# Patient Record
Sex: FEMALE | Race: BLACK OR AFRICAN AMERICAN | Employment: FULL TIME | URBAN - METROPOLITAN AREA
[De-identification: names, ages, dates, MRNs, and addresses within clinical notes are randomized per-mention and may not be internally consistent; named-entity substitution may affect disease eponyms.]

---

## 2018-07-03 ENCOUNTER — HOSPITAL ENCOUNTER (OUTPATIENT)
Dept: PHYSICAL THERAPY | Age: 68
Discharge: HOME | End: 2018-07-03
Payer: COMMERCIAL

## 2018-07-03 PROCEDURE — 97162 PT EVAL MOD COMPLEX 30 MIN: CPT | Performed by: PHYSICAL THERAPIST

## 2018-07-03 PROCEDURE — 97110 THERAPEUTIC EXERCISES: CPT | Performed by: PHYSICAL THERAPIST

## 2018-07-03 NOTE — PROGRESS NOTES
[address] [facility] AT [address], [address] [place] [address] Road  Phone: [phone]   [phone]  PLAN OF CARE / [address] [place] [address] PHYSICAL THERAPY SERVICES  Patient Name: Maryjane Cisneros : 1950   Medical   Diagnosis: Lower back pain [M54.5] Treatment Diagnosis: Lumbar radiculopathy   Onset Date: 2018     Referral Source: Rob Peck MD Start of [facility]): 7/3/2018   Prior Hospitalization: See medical history Provider #: [id number]   Prior Level of Function: Able to ambulate and stand for unlimited time. Comorbidities: HTN, DM, CVA, high BMI, chronic LBP   Medications: Verified on Patient Summary List   The Plan of Care and following information is based on the information from the initial evaluation.   ===========================================================================================  Assessment / key information:  Patient is a 78 y.o. Female that presents today with a provisional classification of posterior lumbar derangement with asymmetric left side radicular symptoms above the knee. Patient responded to repeated lumbar extension with centralization of symptoms. Patient was educated on her diagnosis, prognosis, and POC and instructed to perform repeated lumbar extension in standing or in prone when able hourly to centralize symptoms. Patient educated on spine anatomy, activity modification, and to sit with a lumbar roll to preserve her lumbar lordosis.  Patient verbalized understanding and agrees to this POC.   ===========================================================================================  History HIGH Complexity :3+ comorbidities / personal factors will impact the outcome/ POC ; Examination HIGH Complexity : 4+ Standardized tests and measures addressing body structure, function, activity limitation and / or participation in recreation ; Presentation MEDIUM Complexity : Evolving with changing characteristics ; Decision Making MEDIUM Complexity : FOTO score of 26-74; Complexity MEDIUM  Problem List: pain affecting function, decrease ROM, decrease strength, impaired gait/ balance, decrease ADL/ functional abilitiies, decrease activity tolerance and decrease flexibility/ joint mobility   Treatment Plan may include any combination of the following: Therapeutic exercise, Therapeutic activities, Neuromuscular re-education, Physical agent/modality, Gait/balance training, Manual therapy and Patient education  Patient / Family readiness to learn indicated by: asking questions and trying to perform skills  Persons(s) to be included in education: patient (P)  Barriers to Learning/Limitations: None  Measures taken:    Patient Goal (s): To walk without pain and increase stamina. Patient self reported health status: fair  Rehabilitation Potential: good   Short Term Goals: To be accomplished in  4  weeks:  1. Patient will be independent with HEP to demonstrate active involvement in their recovery. 2. Patient will be able to centralize symptoms independently to demonstrate independence with symptom management.  Long Term Goals: To be accomplished in  12  weeks:  1. Patient will report no radicular symptoms for at least 2 weeks to indicate resolution of symptoms. 2. Increase FOTO score to 51 for improved overall function. 3. Patient will report no onset of radicular symptoms with ambulation up to 45 minutes to improve her ability to ambulate long community distances. Frequency / Duration:   Patient to be seen  2  times per week for 12  weeks: Anticipate a decrease in frequency per week as patient progresses in therapy.    Patient / Caregiver education and instruction: activity modification and exercises  G-Codes (GP): N/A  Therapist Signature: Tish Lomeli DPT Date: 4/1/8010   Certification Period: NA Time: 12:56 PM ===========================================================================================  I certify that the above Physical Therapy Services are being furnished while the patient is under my care. I agree with the treatment plan and certify that this therapy is necessary. Physician Signature:        Date:       Time:     Please sign and return to [facility] or you may fax the signed copy to [phone]. Thank you.

## 2018-07-03 NOTE — PROGRESS NOTES
PT DAILY TREATMENT NOTE     Patient Name: Tamara Buenrostro  Date:7/3/2018  : 1950  [x]  Patient  Verified  Payor: Rosetta Ortiz / Plan: [employer] / Product Type: HMO /    In time:1:40  Out time:2:38  Total Treatment Time (min): 30  Total Timed Codes (min): NA  1:1 Treatment Time (min): NA   Visit #: 1 of 24    Treatment Area: Lower back pain [M54.5]    SUBJECTIVE  Pain Level (0-10 scale): 5/10  Any medication changes, allergies to medications, adverse drug reactions, diagnosis change, or new procedure performed?: [x] No    [] Yes (see summary sheet for update)  Subjective functional status/changes:   [] No changes reported  Patient states having chronic low back pain and similar symptoms in 4445 that were complicated by a CVA at the time; states having left side weakness that improved however continues to have problems with left drop foot. States about 6 months ago having a significant increase in symptoms on her left low back that radiates down her left buttock and onset of burning down the side of the thigh. Denies B/B changes since onset however reports intermittent N/T to the left foot that has been persistent since her CVA. Currently, symptoms are present in the left low back down the side of her left thigh that stops at the knee. OBJECTIVE    30 min Therapeutic Exercise:  [x] See flow sheet :   Rationale: increase ROM and centralize symptoms to improve the patients ability to ambulate and stand.            with TE min Patient Education: [x] Review HEP    [] Progressed/Changed HEP based on:   [] positioning   [] body mechanics   [] transfers   [] heat/ice application        Other Objective/Functional Measures: See eval    Pain Level (0-10 scale) post treatment: 3-4/10    ASSESSMENT/Changes in Function:     Patient will continue to benefit from skilled PT services to modify and progress therapeutic interventions, address functional mobility deficits, address ROM deficits, address strength deficits, analyze and address soft tissue restrictions, analyze and cue movement patterns, analyze and modify body mechanics/ergonomics and assess and modify postural abnormalities to attain remaining goals.      [x]  See Plan of Care  []  See progress note/recertification  []  See Discharge Summary       Justification for Eval Code Complexity:  Patient History : chronic LBP, high BMI, DM, HTN, history of CVA, sedentary lifestyle  Examination see exam   Clinical Presentation: evolving  Clinical Decision Making : FOTO : 35 /100    PLAN  [x]  Upgrade activities as tolerated     [x]  Continue plan of care  []  Update interventions per flow sheet       []  Discharge due to:_  []  Other:_      Janice Fernandez DPT 7/3/2018  12:57 PM

## 2018-07-09 ENCOUNTER — HOSPITAL ENCOUNTER (OUTPATIENT)
Dept: PHYSICAL THERAPY | Age: 68
Discharge: HOME | End: 2018-07-09
Payer: COMMERCIAL

## 2018-07-09 PROCEDURE — 97110 THERAPEUTIC EXERCISES: CPT

## 2018-07-09 NOTE — PROGRESS NOTES
PT DAILY TREATMENT NOTE     Patient Name: Wong Marinelli  Date:2018  : 1950  [x]  Patient  Verified  Payor: Marko Neal / Plan: [employer]MO / Product Type: HMO /    In time: 2:38  Out time: 3:12  Total Treatment Time (min): 34  Total Timed Codes (min): NA  1:1 Treatment Time (min): NA   Visit #: 2 of 24    Treatment Area: Lower back pain [M54.5]    SUBJECTIVE  Pain Level (0-10 scale): 4/10 peripheral sx to lateral knee  Any medication changes, allergies to medications, adverse drug reactions, diagnosis change, or new procedure performed?: [x] No    [] Yes (see summary sheet for update)  Subjective functional status/changes:   [] No changes reported  Patient reports compliance with HEP. She decline to perform prone press up due to wrist pain. OBJECTIVE    34 min Therapeutic Exercise:  [x] See flow sheet :   Rationale: increase ROM and centralize symptoms to improve the patients ability to ambulate and stand. with TE min Patient Education: [x] Review HEP    [] Progressed/Changed HEP based on:   [x] positioning   [x] body mechanics   [x] transfers   [] heat/ice application        Other Objective/Functional Measures: Initiated POC    Pain Level (0-10 scale) post treatment: 6/10 centralized to right L/S    ASSESSMENT/Changes in Function:    Initiated therex today per flow sheet, requiring vc and demo 100% of the time for proper form and technique. Patient educated on reps, hold times and frequency for HEP as well as proper body mechanics, posture and bed mobility to protect her spine. Patient verbalizes understanding.      Patient will continue to benefit from skilled PT services to modify and progress therapeutic interventions, address functional mobility deficits, address ROM deficits, address strength deficits, analyze and address soft tissue restrictions, analyze and cue movement patterns, analyze and modify body mechanics/ergonomics and assess and modify postural abnormalities to attain remaining goals. [x]  See Plan of Care  []  See progress note/recertification  []  See Discharge Summary       Progress Towards Goals / Updated Goals: · Short Term Goals: To be accomplished in  4  weeks:  1. Patient will be independent with HEP to demonstrate active involvement in their recovery. Current: Pt reports compliance however questions how many reps and what hold time. 7/9/18  2. Patient will be able to centralize symptoms independently to demonstrate independence with symptom management. · Long Term Goals: To be accomplished in  12  weeks:  1. Patient will report no radicular symptoms for at least 2 weeks to indicate resolution of symptoms. 2. Increase FOTO score to 51 for improved overall function. 3. Patient will report no onset of radicular symptoms with ambulation up to 45 minutes to improve her ability to ambulate long community distances.     PLAN  [x]  Upgrade activities as tolerated     [x]  Continue plan of care  []  Update interventions per flow sheet       []  Discharge due to:_  []  Other:_      DANIEL Fernández 7/9/2018  3:15 PM

## 2018-07-11 ENCOUNTER — HOSPITAL ENCOUNTER (OUTPATIENT)
Dept: PHYSICAL THERAPY | Age: 68
Discharge: HOME | End: 2018-07-11
Payer: COMMERCIAL

## 2018-07-11 PROCEDURE — 97110 THERAPEUTIC EXERCISES: CPT

## 2018-07-11 NOTE — PROGRESS NOTES
PT DAILY TREATMENT NOTE     Patient Name: Wendi Richards  Date:2018  : 1950  [x]  Patient  Verified  Payor: Cheyenne Vitaleos / Plan: [employer] HMO / Product Type: HMO /    In time: 6:05  Out time: 6:29  Total Treatment Time (min): 24  Total Timed Codes (min): NA  1:1 Treatment Time (min): NA   Visit #: 3 of 24    Treatment Area: Lower back pain [M54.5]    SUBJECTIVE  Pain Level (0-10 scale): -7/10  Any medication changes, allergies to medications, adverse drug reactions, diagnosis change, or new procedure performed?: [x] No    [] Yes (see summary sheet for update)  Subjective functional status/changes:   [x] No changes reported      OBJECTIVE    24 min Therapeutic Exercise:  [x] See flow sheet :   Rationale: increase ROM and centralize symptoms to improve the patients ability to ambulate and stand. with TE min Patient Education: [x] Review HEP    [] Progressed/Changed HEP based on:   [x] positioning   [x] body mechanics   [x] transfers   [] heat/ice application        Other Objective/Functional Measures:    - No change in TE today secondary to pt presents with increased pain level. Pain Level (0-10 scale) post treatment: 3/10    ASSESSMENT/Changes in Function:    Patient demonstrates fair tolerance to TE, without increase in pain. She reports decreased peripheral sx today. Will continue to progress as able. Patient will continue to benefit from skilled PT services to modify and progress therapeutic interventions, address functional mobility deficits, address ROM deficits, address strength deficits, analyze and address soft tissue restrictions, analyze and cue movement patterns, analyze and modify body mechanics/ergonomics and assess and modify postural abnormalities to attain remaining goals. [x]  See Plan of Care  []  See progress note/recertification  []  See Discharge Summary       Progress Towards Goals / Updated Goals: · Short Term Goals:  To be accomplished in  4  weeks:  1. Patient will be independent with HEP to demonstrate active involvement in their recovery. Current: Pt reports compliance however questions how many reps and what hold time. 7/9/18  2. Patient will be able to centralize symptoms independently to demonstrate independence with symptom management. · Long Term Goals: To be accomplished in  12  weeks:  1. Patient will report no radicular symptoms for at least 2 weeks to indicate resolution of symptoms. 2. Increase FOTO score to 51 for improved overall function. 3. Patient will report no onset of radicular symptoms with ambulation up to 45 minutes to improve her ability to ambulate long community distances.     PLAN  [x]  Upgrade activities as tolerated     [x]  Continue plan of care  []  Update interventions per flow sheet       []  Discharge due to:_  []  Other:_      DANIEL Sutton 7/11/2018  6:45 PM

## 2018-07-17 ENCOUNTER — HOSPITAL ENCOUNTER (OUTPATIENT)
Dept: PHYSICAL THERAPY | Age: 68
Discharge: HOME | End: 2018-07-17
Payer: COMMERCIAL

## 2018-07-17 PROCEDURE — 97110 THERAPEUTIC EXERCISES: CPT

## 2018-07-17 NOTE — PROGRESS NOTES
PT DAILY TREATMENT NOTE     Patient Name: Stanislaw Hernandez  Date:2018  : 1950  [x]  Patient  Verified  Payor: Ulises Desouza / Plan: [employer] / Product Type: HMO /    In time: 1:30  Out time: 2:00  Total Treatment Time (min): 30  Total Timed Codes (min): 30  1:1 Treatment Time (min): NA   Visit #: 4 of 24    Treatment Area: Lower back pain [M54.5]    SUBJECTIVE  Pain Level (0-10 scale): 3/10 with radicular sx to left knee  Any medication changes, allergies to medications, adverse drug reactions, diagnosis change, or new procedure performed?: [x] No    [] Yes (see summary sheet for update)  Subjective functional status/changes:   [x] No changes reported  My most intense pan comes from standing for any length of time. I have not stood up much today. OBJECTIVE    30 min Therapeutic Exercise:  [x] See flow sheet :   Rationale: increase ROM and centralize symptoms to improve the patients ability to ambulate and stand. with TE min Patient Education: [x] Review HEP    [] Progressed/Changed HEP based on:   [x] positioning   [x] body mechanics   [x] transfers   [] heat/ice application        Other Objective/Functional Measures:   - No change in TE today secondary to pt presents with increased pain level. - Trial of HOC at wall 2 x 10 with 10 second holds    Pain Level (0-10 scale) post treatment: 1/10 with radicular sx to left knee    ASSESSMENT/Changes in Function:    Patient brought [place] paperwork to have filled out, advised to have Dr. Sheri France complete them. Asked patient to attempt PPU wearing her wrist brace for her carpal tunnel to allow for progression of L/S extension. No change in sx after TE.      Patient will continue to benefit from skilled PT services to modify and progress therapeutic interventions, address functional mobility deficits, address ROM deficits, address strength deficits, analyze and address soft tissue restrictions, analyze and cue movement patterns, analyze and modify body mechanics/ergonomics and assess and modify postural abnormalities to attain remaining goals. []  See Plan of Care  []  See progress note/recertification  []  See Discharge Summary       Progress Towards Goals / Updated Goals: · Short Term Goals: To be accomplished in  4  weeks:   1. Patient will be independent with HEP to demonstrate active involvement in their recovery. Current: Pt reports she only does partial sets. 7/17/18  2. Patient will be able to centralize symptoms independently to demonstrate independence with symptom management. Current: No change. 7/17/18  · Long Term Goals: To be accomplished in  12  weeks:  1. Patient will report no radicular symptoms for at least 2 weeks to indicate resolution of symptoms. 2. Increase FOTO score to 51 for improved overall function. 3. Patient will report no onset of radicular symptoms with ambulation up to 45 minutes to improve her ability to ambulate long community distances.     PLAN  [x]  Upgrade activities as tolerated     [x]  Continue plan of care  []  Update interventions per flow sheet       []  Discharge due to:_  []  Other:_      DANIEL Norton 7/17/2018  2:02 PM

## 2018-07-19 ENCOUNTER — HOSPITAL ENCOUNTER (OUTPATIENT)
Dept: PHYSICAL THERAPY | Age: 68
Discharge: HOME | End: 2018-07-19
Payer: COMMERCIAL

## 2018-07-19 PROCEDURE — 97110 THERAPEUTIC EXERCISES: CPT

## 2018-07-19 NOTE — PROGRESS NOTES
PT DAILY TREATMENT NOTE     Patient Name: Donavan Fulton  Date:2018  : 1950  [x]  Patient  Verified  Payor: Yelena Hooker / Plan: [employer]MO / Product Type: HMO /    In time:5:20  Out time:6:00  Total Treatment Time (min): 40  Total Timed Codes (min): 40  1:1 Treatment Time (min):    Visit #: 5 of 24    Treatment Area: Lower back pain [M54.5]    SUBJECTIVE  Pain Level (0-10 scale): 6  Any medication changes, allergies to medications, adverse drug reactions, diagnosis change, or new procedure performed?: [x] No    [] Yes (see summary sheet for update)  Subjective functional status/changes:   [] No changes reported  Pt states for the majority of today she did not have the pulling/burning in her left thigh/knee area, but does have pain now, especially with time on her feet. OBJECTIVE      40 min Therapeutic Exercise:  [] See flow sheet :   Rationale: increase ROM and increase strength to improve the patients ability to change and maintain body/trunk positions    Other Objective/Functional Measures:     Pain Level (0-10 scale) post treatment: 1/10 low back    ASSESSMENT/Changes in Function: Added prone quad stretches and piriformis stretches which were first addressed manually. Significant restriction was found, and pt reported improved relief upon completion of these stretches (in addition to lumbar extension-based exercises). Patient will continue to benefit from skilled PT services to modify and progress therapeutic interventions, address ROM deficits, analyze and address soft tissue restrictions and analyze and cue movement patterns to attain remaining goals. []  See Plan of Care  []  See progress note/recertification  []  See Discharge Summary         Progress towards goals / Updated goals:  STG #2. Patient will be able to centralize symptoms independently to demonstrate independence with symptom management.  Progressing  PLAN  []  Upgrade activities as tolerated     [x]  Continue plan of care  []  Update interventions per flow sheet       []  Discharge due to:_  []  Other:_      Mitzi Whitmore, PT 7/19/2018  11:26 AM

## 2018-07-24 ENCOUNTER — HOSPITAL ENCOUNTER (OUTPATIENT)
Dept: PHYSICAL THERAPY | Age: 68
Discharge: HOME | End: 2018-07-24
Payer: COMMERCIAL

## 2018-07-24 PROCEDURE — 97110 THERAPEUTIC EXERCISES: CPT

## 2018-07-24 NOTE — PROGRESS NOTES
PT DAILY TREATMENT NOTE     Patient Name: Rosalina Fofana  Date:2018  : 1950  [x]  Patient  Verified  Payor: Rola Bran / Plan: [employer] / Product Type: HMO /    In time:11:28  Out time:12:15  Total Treatment Time (min): 47  Total Timed Codes (min): 47  1:1 Treatment Time (min):    Visit #: 6  24    Treatment Area: Lower back pain [M54.5]    SUBJECTIVE  Pain Level (0-10 scale): 4/10  Any medication changes, allergies to medications, adverse drug reactions, diagnosis change, or new procedure performed?: [x] No    [] Yes (see summary sheet for update)  Subjective functional status/changes:   [] No changes reported  I am not having a good day with the pain in my leg today, I can't even stand to stand or walk for even more than a couple of minutes before I have to sit down. OBJECTIVE      47 min Therapeutic Exercise:  [x] See flow sheet :   Rationale: increase ROM and increase strength to improve the patients ability to improve functional abilities           min Patient Education: [x] Review HEP    [] Progressed/Changed HEP based on:   [] positioning   [] body mechanics   [] transfers   [] heat/ice application        Other Objective/Functional Measures:     Pain Level (0-10 scale) post treatment: 0    ASSESSMENT/Changes in Function: Pt presented with chief c/o increased left LE radicular symptoms down to knee with decreased standing/walking tolerance since last tx. Pt was able to advance to level 2 dead bug stabs as well as addition of mini band resistance with sidelying clamshells with moderate challenge today. Will continue to progress/advance patient within current POC as tolerated with monitoring symptoms.     Patient will continue to benefit from skilled PT services to modify and progress therapeutic interventions, address functional mobility deficits, address ROM deficits, address strength deficits, analyze and cue movement patterns, analyze and modify body mechanics/ergonomics and assess and modify postural abnormalities to attain remaining goals.      []  See Plan of Care  []  See progress note/recertification  []  See Discharge Summary         Progress towards goals / Updated goals:      PLAN  [x]  Upgrade activities as tolerated     []  Continue plan of care  []  Update interventions per flow sheet       []  Discharge due to:_  []  Other:_      Alejandro Kumar, PTA 7/24/2018  11:30 AM

## 2018-07-30 ENCOUNTER — HOSPITAL ENCOUNTER (OUTPATIENT)
Dept: PHYSICAL THERAPY | Age: 68
Discharge: HOME | End: 2018-07-30
Payer: COMMERCIAL

## 2018-07-30 PROCEDURE — 97110 THERAPEUTIC EXERCISES: CPT

## 2018-07-30 NOTE — PROGRESS NOTES
PT DAILY TREATMENT NOTE  Patient Name: Keila Blum Date:2018 : 1950 [x]  Patient  Verified Payor: Henrique Score / Plan: [employer] [address] [employer] HMO / Product Type: HMO /   
In time:2:36  Out time:3:16 Total Treatment Time (min): 40 Total Timed Codes (min): 40 
1:1 Treatment Time (min):   
Visit #: 7 of 24 Treatment Area: Lower back pain [M54.5] SUBJECTIVE Pain Level (0-10 scale): 5/10 Any medication changes, allergies to medications, adverse drug reactions, diagnosis change, or new procedure performed?: [x] No    [] Yes (see summary sheet for update) Subjective functional status/changes:   [] No changes reported My back and leg hasn't been doing as good as the last time that I was here because I worked for 4 days in a row and I was hurting after doing all of that. OBJECTIVE 40 min Therapeutic Exercise:  [x] See flow sheet :  
Rationale: increase ROM and increase strength to improve the patients ability to improve functional abilities 
       
 min Patient Education: [x] Review HEP    [] Progressed/Changed HEP based on:  
[] positioning   [] body mechanics   [] transfers   [] heat/ice application Other Objective/Functional Measures:  
 
Pain Level (0-10 scale) post treatment: 0 
 
ASSESSMENT/Changes in Function: Pt presented with chief c/o increased intensity/frequency of lumbar and left LE radicular symptoms after prolonged sitting with work duties for 4 days straight since last treatment, but was able to tolerate full normal therex regiment with min to mod challenge today. Will review detailed progress/goals for physician update for MD follow up after next treatment with continuing to progress/advance within current POC/protocol as tolerated.  
 
Patient will continue to benefit from skilled PT services to modify and progress therapeutic interventions, address functional mobility deficits, address ROM deficits, address strength deficits, analyze and cue movement patterns, analyze and modify body mechanics/ergonomics and assess and modify postural abnormalities to attain remaining goals. []  See Plan of Care 
[]  See progress note/recertification 
[]  See Discharge Summary Progress towards goals / Updated goals: PLAN [x]  Upgrade activities as tolerated     []  Continue plan of care 
[]  Update interventions per flow sheet      
[]  Discharge due to:_ 
[]  Other:_ Joy Jacques PTA 7/30/2018  2:34 PM 
Future Appointments Date Time Provider Hussein Saul 8/6/2018 1:30 PM Joy Jacques PTA [facility]  
8/8/2018 5:45 PM Joy Jacques PTA [facility]  
8/13/2018 1:30 PM Joy Jacques PTA [facility]  
8/16/2018 5:30 PM Riggins Capes [facility]  
8/20/2018 1:30 PM Joy Jacques PTA [facility]  
8/23/2018 5:30 PM Riggins Capes [facility]  
8/27/2018 1:30 PM Joy Jacques PTA [facility]  
8/30/2018 5:30 PM Riggins Capes [facility]

## 2018-08-06 ENCOUNTER — HOSPITAL ENCOUNTER (OUTPATIENT)
Dept: PHYSICAL THERAPY | Age: 68
Discharge: HOME | End: 2018-08-06
Payer: COMMERCIAL

## 2018-08-06 PROCEDURE — 97110 THERAPEUTIC EXERCISES: CPT

## 2018-08-06 NOTE — PROGRESS NOTES
[address] [facility] AT [address], [address] [place] [address]  Phone: [phone]   [phone]  PROGRESS NOTE  Patient Name: Charline Garcia : 1950   Treatment/Medical Diagnosis: Lower back pain [M54.5]   Referral Source: Donny Izaguirre MD     Date of Initial Visit: 7/3/18 Attended Visits: 8 Missed Visits: 0     SUMMARY OF TREATMENT  Therapeutic exercise for lumbar/LE flexibility, postural awareness/strengthening, lumbopelvic/core stabilization and HEP. CURRENT STATUS  Patient reports approximately 25% overall improvement from therapy since initial evaluation with 5/10 pain level on average, increased to 7-8/10 at the worst with prolonged standing/walking >5 minutes. Pt is making inconsistent progress with LE symptom reduction, but is making slow but steady progress with gaining lumbar and LE mobility/flexibility as advancing with lumbopelvic/core strengthening program with current POC. Pt would benefit from continued therapy for 8 additional visits to achieve maximum medical benefit/potential from current POC. Will continue to progress/advance patient within current POC as tolerated with monitoring symptoms. Lumbar AROM= Flexion=75%; Extension=50%:  Side bending= Right=60%, Left=90%  Goal/Measure of Progress Goal Met? 1. Patient will be able to centralize symptoms independently to demonstrate independence with symptom management. Status at last Eval: Progressing Current Status: Not Met no   2. Patient will report no radicular symptoms for at least 2 weeks to indicate resolution of symptoms. Status at last Eval: Progressing  Current Status: Not Met no   3. Increase FOTO score to 51 for improved overall function. Status at last Eval: 35/100 Current Status: 40/100 no   4. Patient will report no onset of radicular symptoms with ambulation up to 45 minutes to improve her ability to ambulate long community distances.    Status at last Eval: Progressing  Current Status: Not Met no     New Goals to be achieved in __8__  treatments:  1. Patient will be able to centralize symptoms independently to demonstrate independence with symptom management. 2.  Patient will report no radicular symptoms for at least 2 weeks to indicate resolution of symptoms. 3.  Increase FOTO score to 51 for improved overall function. 4.  Patient will report no onset of radicular symptoms with ambulation up to 45 minutes to improve her ability to ambulate long community distances. RECOMMENDATIONS  Continue with current POC for 8 additional visits with advancing as tolerated, then reassess for the need for continuation or discharge from therapy. If you have any questions/comments please contact us directly at [phone]. Thank you for allowing us to assist in the care of your patient. LPTA Signature: Giancarlo Gudino PTA  Date: 8/6/2018   PT Signature: STEPHANY Lim OCS   Time: 11:37 AM   NOTE TO PHYSICIAN:  PLEASE COMPLETE THE ORDERS BELOW AND FAX TO   [facility] at [facility] UNIT: [phone]. If you are unable to process this request in 24 hours please contact our office: [phone].    ___ I have read the above report and request that my patient continue as recommended.   ___ I have read the above report and request that my patient continue therapy with the following changes/special instructions:_________________________________________________________   ___ I have read the above report and request that my patient be discharged from therapy.      Physician Signature:        Date:       Time:

## 2018-08-06 NOTE — PROGRESS NOTES
PT DAILY TREATMENT NOTE     Patient Name: Belen Quezada  Date:2018  : 1950  [x]  Patient  Verified  Payor: [place] [place] / Plan: [employer] HMO / Product Type: HMO /    In time:1:38  Out time:2:20  Total Treatment Time (min): 42  Total Timed Codes (min): 42  1:1 Treatment Time (min):    Visit #: 7 of 24    Treatment Area: Lower back pain [M54.5]    SUBJECTIVE  Pain Level (0-10 scale): Left LE=3/10; Right LE=5/10  Any medication changes, allergies to medications, adverse drug reactions, diagnosis change, or new procedure performed?: [x] No    [] Yes (see summary sheet for update)  Subjective functional status/changes:   [] No changes reported  I would like to continue with the therapy at least 2 times a week because I am still having a considerable amount of pain in my right leg, but it has gotten better since I started the therapy. OBJECTIVE      42 min Therapeutic Exercise:  [] See flow sheet :   Rationale: increase ROM and increase strength to improve the patients ability to improve functional abilities               min Patient Education: [x] Review HEP    [] Progressed/Changed HEP based on:   [] positioning   [] body mechanics   [] transfers   [] heat/ice application        Other Objective/Functional Measures:     Pain Level (0-10 scale) post treatment: Right LE=4/10; Left LE=0    ASSESSMENT/Changes in Function:     Patient will continue to benefit from skilled PT services to modify and progress therapeutic interventions, address functional mobility deficits, address ROM deficits, address strength deficits, analyze and cue movement patterns, analyze and modify body mechanics/ergonomics and assess and modify postural abnormalities to attain remaining goals.      []  See Plan of Care  [x]  See progress note/recertification  []  See Discharge Summary         Progress towards goals / Updated goals:  See Progress note/Physician update for full detailed progress towards established goals.     PLAN  [x]  Upgrade activities as tolerated     []  Continue plan of care  []  Update interventions per flow sheet       []  Discharge due to:_  []  Other:_      Judit Diehl PTA 8/6/2018  11:08 AM

## 2018-08-08 ENCOUNTER — HOSPITAL ENCOUNTER (OUTPATIENT)
Dept: PHYSICAL THERAPY | Age: 68
Discharge: HOME | End: 2018-08-08
Payer: COMMERCIAL

## 2018-08-08 PROCEDURE — 97110 THERAPEUTIC EXERCISES: CPT

## 2018-08-08 NOTE — PROGRESS NOTES
PT DAILY TREATMENT NOTE     Patient Name: Lazaro Haney  Date:2018  : 1950  [x]  Patient  Verified  Payor: Freddy Mina / Plan: [employer] / Product Type: HMO /    In time:5:45  Out time:6:39  Total Treatment Time (min): 54  Total Timed Codes (min): 44  1:1 Treatment Time (min):    Visit #: 8 of 15    Treatment Area: Lower back pain [M54.5]    SUBJECTIVE  Pain Level (0-10 scale): Left LE=6-7/10; Right LE=5/10  Any medication changes, allergies to medications, adverse drug reactions, diagnosis change, or new procedure performed?: [x] No    [] Yes (see summary sheet for update)  Subjective functional status/changes:   [] No changes reported  I feel the pain more in my left leg going down to the side of my knee more than anything today, which is weird because I felt it more in my right leg last time. OBJECTIVE      10 mins= Moist heat in supine    44 min Therapeutic Exercise:  [x] See flow sheet :   Rationale: increase ROM and increase strength to improve the patients ability to improve functional abilities           min Patient Education: [x] Review HEP    [] Progressed/Changed HEP based on:   [] positioning   [] body mechanics   [] transfers   [] heat/ice application        Other Objective/Functional Measures:     Pain Level (0-10 scale) post treatment: 2/10    ASSESSMENT/Changes in Function: Pt presented with chief c/o left LE radicular pain down to lateral knee today versus right LE radicular pain last visit. Pt responded well to trial with standing left lumbar side bending followed by extension to help reduce derangement, but was instructed to address appropriate side depending on side of radicular symptoms. Will continue to progress/advance patient within current POC as tolerated with monitoring symptoms.      Patient will continue to benefit from skilled PT services to modify and progress therapeutic interventions, address functional mobility deficits, address ROM deficits, address strength deficits, analyze and cue movement patterns, analyze and modify body mechanics/ergonomics and assess and modify postural abnormalities to attain remaining goals.      []  See Plan of Care  []  See progress note/recertification  []  See Discharge Summary         Progress towards goals / Updated goals:      PLAN  [x]  Upgrade activities as tolerated     []  Continue plan of care  []  Update interventions per flow sheet       []  Discharge due to:_  []  Other:_      Mary Freeman, ISMAEL 8/8/2018  5:44 PM

## 2018-08-13 ENCOUNTER — HOSPITAL ENCOUNTER (OUTPATIENT)
Dept: PHYSICAL THERAPY | Age: 68
Discharge: HOME | End: 2018-08-13
Payer: COMMERCIAL

## 2018-08-13 PROCEDURE — 97110 THERAPEUTIC EXERCISES: CPT

## 2018-08-16 ENCOUNTER — HOSPITAL ENCOUNTER (OUTPATIENT)
Dept: PHYSICAL THERAPY | Age: 68
Discharge: HOME | End: 2018-08-16
Payer: COMMERCIAL

## 2018-08-16 PROCEDURE — 97110 THERAPEUTIC EXERCISES: CPT

## 2018-08-16 NOTE — PROGRESS NOTES
PT DAILY TREATMENT NOTE     Patient Name: Vinny Burgos  Date:2018  : 1950  [x]  Patient  Verified  Payor: [facility] / Plan: [employer] HMO / Product Type: HMO /    In time:5:20  Out time:6:08  Total Treatment Time (min): 48  Total Timed Codes (min): 48  1:1 Treatment Time (min):    Visit #: 11 of 15    Treatment Area: Lower back pain [M54.5]    SUBJECTIVE  Pain Level (0-10 scale): 5/10 left leg, 3/10 right  Any medication changes, allergies to medications, adverse drug reactions, diagnosis change, or new procedure performed?: [x] No    [] Yes (see summary sheet for update)  Subjective functional status/changes:   [] No changes reported  Pt reports she is just getting off of work. Her legs don't feel too bad sitting, but when she gets up there is stiffness and it progresses with walking. OBJECTIVE    48 min Therapeutic Exercise:  [] See flow sheet :   Rationale: increase ROM and increase strength to improve the patients ability to change and maintain body/trunk positions    Pain Level (0-10 scale) post treatment: 2    ASSESSMENT/Changes in Function: Pt demonstrates decreased R>L iliopsoas flexibility as tested today in [place] position. Pt is not a good candidate for half kneeling stretch, but will try modified standing version at next treatment session. Theraband provided today for pt to continue clamshells for glute strengthening at home. Patient will continue to benefit from skilled PT services to modify and progress therapeutic interventions, address strength deficits and analyze and cue movement patterns to attain remaining goals. []  See Plan of Care  []  See progress note/recertification  []  See Discharge Summary         Progress towards goals / Updated goals:  LTG's: Patient will report no onset of radicular symptoms with ambulation up to 45 minutes to improve her ability to ambulate long community distances.  Not met    PLAN  []  Upgrade activities as tolerated     [x]  Continue plan of care  []  Update interventions per flow sheet       []  Discharge due to:_  []  Other:_      Tiffanie Pulliam PT 8/16/2018  5:58 PM

## 2018-08-20 ENCOUNTER — APPOINTMENT (OUTPATIENT)
Dept: PHYSICAL THERAPY | Age: 68
End: 2018-08-20
Payer: COMMERCIAL

## 2018-08-23 ENCOUNTER — APPOINTMENT (OUTPATIENT)
Dept: PHYSICAL THERAPY | Age: 68
End: 2018-08-23
Payer: COMMERCIAL

## 2018-08-27 ENCOUNTER — HOSPITAL ENCOUNTER (OUTPATIENT)
Dept: PHYSICAL THERAPY | Age: 68
Discharge: HOME | End: 2018-08-27
Payer: COMMERCIAL

## 2018-08-27 PROCEDURE — 97110 THERAPEUTIC EXERCISES: CPT

## 2018-08-27 NOTE — PROGRESS NOTES
PT DAILY TREATMENT NOTE     Patient Name: Scar Gregory  Date:2018  : 1950  [x]  Patient  Verified  Payor: [employer] / Plan: [employer] HMO / Product Type: HMO /    In time:1:31  Out time:2:11  Total Treatment Time (min): 40  Total Timed Codes (min): 40  1:1 Treatment Time (min):    Visit #: 12 of 15    Treatment Area: Lower back pain [M54.5]    SUBJECTIVE  Pain Level (0-10 scale): left LE=4/10; right LE=2/10  Any medication changes, allergies to medications, adverse drug reactions, diagnosis change, or new procedure performed?: [x] No    [] Yes (see summary sheet for update)  Subjective functional status/changes:   [] No changes reported  My right leg was hurting for about 4 or 5 days after that new stretch that we did last time, so I think that I want to hold off on doing that today. OBJECTIVE      40 min Therapeutic Exercise:  [] See flow sheet :   Rationale: increase ROM and increase strength to improve the patients ability to change and maintain body/trunk positions        min Patient Education: [x] Review HEP    [] Progressed/Changed HEP based on:   [] positioning   [] body mechanics   [] transfers   [] heat/ice application        Other Objective/Functional Measures:     Pain Level (0-10 scale) post treatment: Left LE=1/10; Right LE=3/10    ASSESSMENT/Changes in Function: Pt presented with chief c/o increased right LE pain/symptoms for 4-5 days after manual stretching of right LE last treatment, but was able to tolerate full normal therex regiment with min to mod challenge today. Will continue to progress/advance patient within current POC as tolerated with monitoring symptoms.     Patient will continue to benefit from skilled PT services to modify and progress therapeutic interventions, address functional mobility deficits, address ROM deficits, address strength deficits, analyze and cue movement patterns, analyze and modify body mechanics/ergonomics and assess and modify postural abnormalities to attain remaining goals.      []  See Plan of Care  []  See progress note/recertification  []  See Discharge Summary         Progress towards goals / Updated goals:      PLAN  [x]  Upgrade activities as tolerated     []  Continue plan of care  []  Update interventions per flow sheet       []  Discharge due to:_  []  Other:_      Minnie Au, PTA 8/27/2018  1:38 PM

## 2018-08-30 ENCOUNTER — HOSPITAL ENCOUNTER (OUTPATIENT)
Dept: PHYSICAL THERAPY | Age: 68
Discharge: HOME | End: 2018-08-30
Payer: COMMERCIAL

## 2018-08-30 PROCEDURE — 97110 THERAPEUTIC EXERCISES: CPT

## 2018-08-30 NOTE — PROGRESS NOTES
PT DAILY TREATMENT NOTE  Patient Name: Melisa Saravia Date:2018 : 1950 [x]  Patient  Verified Payor: Madhu [employer] / Plan: [employer] HMO / Product Type: HMO /   
In [address] Total Treatment Time (min): 45 Total Timed Codes (min): 45 
1:1 Treatment Time (min):   
Visit #: 31 of 15 Treatment Area: Lower back pain [M54.5] SUBJECTIVE Pain Level (0-10 scale): 3 Any medication changes, allergies to medications, adverse drug reactions, diagnosis change, or new procedure performed?: [x] No    [] Yes (see summary sheet for update) Subjective functional status/changes:   [] No changes reported Pt says she was experiencing very little leg pain and then just recently stepped on a rock or uneven area in the parking lot and it caused a twinge in her leg. OBJECTIVE Modality rationale: Pt deferred MH Min Type Additional Details  
 [] Estim: []Att   []Unatt        []TENS instruct []IFC  []Premod   []NMES []Other:  []w/US   []w/ice   []w/heat Position: Location:  
 []  Traction: [] Cervical       []Lumbar 
                     [] Prone          []Supine []Intermittent   []Continuous Lbs: 
[] before manual 
[] after manual  
 []  Ultrasound: []Continuous   [] Pulsed []1MHz   []3MHz Location: 
W/cm2:  
 []  Iontophoresis with dexamethasone Location: [] Take home patch  
[] In clinic  
 []  Ice     []  heat 
[]  Ice massage Position: Location:  
 []  Vasopneumatic Device Pressure:       [] lo [] med [] hi  
Temperature: [] lo [] med [] hi  
[] Skin assessment post-treatment:  []intact []redness- no adverse reaction 
     []redness  adverse reaction:  
 
 
45 min Therapeutic Exercise:  [] See flow sheet :  
Rationale: increase ROM and increase strength to improve the patients ability to change and maintain body/trunk positions Pain Level (0-10 scale) post treatment: 2 
 
ASSESSMENT/Changes in Function: Reviewed modified hip flexor stretch in standing with foot on step. Pt was urged to continue this due to stiffness and frequency/duration of her daily sitting. Patient will continue to benefit from skilled PT services to modify and progress therapeutic interventions, address functional mobility deficits, address ROM deficits, address strength deficits and analyze and cue movement patterns to attain remaining goals. PLAN 
[]  Upgrade activities as tolerated     [x]  Continue plan of care 
[]  Update interventions per flow sheet      
[]  Discharge due to:_ 
[]  Other:_ Jean-Pierre Mena PT 8/30/2018  11:01 AM

## 2018-09-04 ENCOUNTER — APPOINTMENT (OUTPATIENT)
Dept: PHYSICAL THERAPY | Age: 68
End: 2018-09-04
Payer: COMMERCIAL

## 2018-09-06 ENCOUNTER — HOSPITAL ENCOUNTER (OUTPATIENT)
Dept: PHYSICAL THERAPY | Age: 68
Discharge: HOME | End: 2018-09-06
Payer: COMMERCIAL

## 2018-09-06 PROCEDURE — 97110 THERAPEUTIC EXERCISES: CPT

## 2018-09-06 NOTE — PROGRESS NOTES
PT DAILY TREATMENT NOTE  Patient Name: Scar Gregory Date:2018 : 1950 [x]  Patient  Verified Payor: [employer] / Plan: [employer] HMO / Product Type: HMO /   
In [address] Total Treatment Time (min): 41 Total Timed Codes (min): 41 
1:1 Treatment Time (min):  
Visit #: 14 of 15 Treatment Area: Lower back pain [M54.5] SUBJECTIVE Pain Level (0-10 scale): 2 Any medication changes, allergies to medications, adverse drug reactions, diagnosis change, or new procedure performed?: [x] No    [] Yes (see summary sheet for update) Subjective functional status/changes:   [] No changes reported \"This week was a pretty good week. I always have some pain in the leg, but I didn't have any really bad flare-ups. Stretching out my hip flexor last visit definitely helped I think. OBJECTIVE Modality rationale: Pt deferred Min Type Additional Details  
 [] Estim: []Att   []Unatt        []TENS instruct []IFC  []Premod   []NMES []Other:  []w/US   []w/ice   []w/heat Position: Location:  
 []  Traction: [] Cervical       []Lumbar 
                     [] Prone          []Supine []Intermittent   []Continuous Lbs: 
[] before manual 
[] after manual  
 []  Ultrasound: []Continuous   [] Pulsed []1MHz   []3MHz Location: 
W/cm2:  
 []  Iontophoresis with dexamethasone Location: [] Take home patch  
[] In clinic  
 []  Ice     []  heat 
[]  Ice massage Position: Location:  
 []  Vasopneumatic Device Pressure:       [] lo [] med [] hi  
Temperature: [] lo [] med [] hi  
[] Skin assessment post-treatment:  []intact []redness- no adverse reaction 
     []redness  adverse reaction:  
 
 
43 min Therapeutic Exercise:  [] See flow sheet :  
Rationale: increase strength and improve coordination to improve the patients ability to change and maintain body/trunk positions Pain Level (0-10 scale) post treatment: 0 
 
ASSESSMENT/Changes in Function: Pt was more self-sufficient today with hip flexor stretching (as previously instructed) and declined for manual assistance with this. Pt is likely ready for discharge at next visit upon review of HEP. Patient will continue to benefit from skilled PT services to modify and progress therapeutic interventions, address functional mobility deficits, address strength deficits and analyze and address soft tissue restrictions to attain remaining goals. PLAN 
[]  Upgrade activities as tolerated     [x]  Continue plan of care 
[]  Update interventions per flow sheet      
[]  Discharge due to:_ 
[]  Other:_ Vero Gallagher 9/6/2018  11:52 AM

## 2018-09-11 ENCOUNTER — HOSPITAL ENCOUNTER (OUTPATIENT)
Dept: PHYSICAL THERAPY | Age: 68
Discharge: HOME | End: 2018-09-11
Payer: COMMERCIAL

## 2018-09-11 PROCEDURE — 97110 THERAPEUTIC EXERCISES: CPT

## 2018-09-11 NOTE — PROGRESS NOTES
PT DAILY TREATMENT NOTE  Patient Name: Wendi Richards Date:2018 : 1950 [x]  Patient  Verified Payor: Cheyenne Navarro / Plan: [employer] HMO / Product Type: HMO /   
In time:2:15  Out time:3:03 Total Treatment Time (min): 48 Total Timed Codes (min): 48 
1:1 Treatment Time (min):   
Visit #: 15 of 15 Treatment Area: Lower back pain [M54.5] SUBJECTIVE Pain Level (0-10 scale): 3-4/10 Any medication changes, allergies to medications, adverse drug reactions, diagnosis change, or new procedure performed?: [x] No    [] Yes (see summary sheet for update) Subjective functional status/changes:   [] No changes reported I feel like my back and hip has gotten better since I started the therapy especially with doing those new hip and groin stretches that you have been having me do lately, but the relief is inconsistent, so I think that I would like to keep coming if possible. OBJECTIVE: 
 
48 min Therapeutic Exercise:  [x] See flow sheet :  
Rationale: increase ROM and increase strength to improve the patients ability to improve functional abilities 
       
 min Patient Education: [x] Review HEP    [] Progressed/Changed HEP based on:  
[] positioning   [] body mechanics   [] transfers   [] heat/ice application Other Objective/Functional Measures:  
 
Pain Level (0-10 scale) post treatment: 10 ASSESSMENT/Changes in Function:  
 
Patient will continue to benefit from skilled PT services to modify and progress therapeutic interventions, address functional mobility deficits, address ROM deficits, address strength deficits, analyze and address soft tissue restrictions, analyze and cue movement patterns, analyze and modify body mechanics/ergonomics and assess and modify postural abnormalities to attain remaining goals. []  See Plan of Care [x]  See progress note/recertification 
[]  See Discharge Summary Progress towards goals / Updated goals: See Progress note/Physician update for full detailed progress towards established goals. PLAN [x]  Upgrade activities as tolerated     []  Continue plan of care 
[]  Update interventions per flow sheet      
[]  Discharge due to:_ 
[]  Other:_ Jarett Lewis, PTA 9/11/2018  2:13 PM

## 2018-09-11 NOTE — PROGRESS NOTES
[address] [facility] AT [address] [place] [address] [address], [place], [address] [place] [address] Road  Phone: [phone]   [phone] PROGRESS NOTE Patient Name: Rosalina Fofana : 1950 Treatment/Medical Diagnosis: Lower back pain [M54.5] Referral Source: Lonie Schwab, MD    
Date of Initial Visit: 7/3/18 Attended Visits: 15 Missed Visits: 1 SUMMARY OF TREATMENT Therapeutic exercise for lumbar/LE flexibility, postural awareness/strengthening, lumbopelvic/core stabilization and HEP. CURRENT STATUS Patient reports approximately 55% overall improvement from therapy since initial evaluation with 4/10 pain level on average, increased to 6-7/10 at the worst with prolonged standing/walking >5 minutes. Pt is making inconsistent progress with LE symptom reduction, but is making slow but steady progress with gaining lumbar and LE mobility/flexibility as advancing with lumbopelvic/core strengthening program with current POC. Pt would benefit from continued therapy for 6 additional visits to achieve maximum medical benefit/potential from current POC. Will continue to progress/advance patient within current POC as tolerated with monitoring symptoms. Lumbar AROM= Flexion=80%; Extension=65%:  Side bending= Right=65%, Left=90% Goal/Measure of Progress Goal Met? 1. Patient will be able to centralize symptoms independently to demonstrate independence with symptom management. Status at last Eval: Not Met, Progressing Current Status: Met yes 2. Patient will report no radicular symptoms for at least 2 weeks to indicate resolution of symptoms. Status at last Eval: Not Met, Progressing Current Status: Improved, decreased in frequency and intensity, but not fully met no 3. Increase FOTO score to 51 for improved overall function.   
Status at last Eval: 40/100 (35/100 at initial evaluation) Current Status: 40/100 no  
 4.  Patient will report no onset of radicular symptoms with ambulation up to 45 minutes to improve her ability to ambulate long community distances. Status at last Eval: Not Met, Progressing  Current Status: Not Met, Progressing no New Goals to be achieved in __6__  treatments: 1. Patient will report no radicular symptoms for at least 2 weeks to indicate resolution of symptoms. 2.  Increase FOTO score to 51 for improved overall function. 3.  Patient will report no onset of radicular symptoms with ambulation up to 45 minutes to improve her ability to ambulate long community distances. 4.  Patient will report =/> 75% overall improvement from therapy since initial evaluation to indicate increased functional improvement with current POC. RECOMMENDATIONS Continue with current POC for 6 additional visits with advancing as tolerated, then reassess for discharge from therapy as planned/discussed. If you have any questions/comments please contact us directly at [phone]. Thank you for allowing us to assist in the care of your patient. LPTA Signature: Vee Garcia PTA  Date: 9/11/2018 PT Signature: STEPHANY Roblero OCS Time: 2:23 PM  
NOTE TO PHYSICIAN:  PLEASE COMPLETE THE ORDERS BELOW AND FAX TO [facility] at [facility] UNIT: [phone]. If you are unable to process this request in 24 hours please contact our office: [phone]. 
 
___ I have read the above report and request that my patient continue as recommended.  
___ I have read the above report and request that my patient continue therapy with the following changes/special instructions:_________________________________________________________  
___ I have read the above report and request that my patient be discharged from therapy.   
 
Physician Signature:        Date:       Time:

## 2018-09-17 ENCOUNTER — APPOINTMENT (OUTPATIENT)
Dept: PHYSICAL THERAPY | Age: 68
End: 2018-09-17
Payer: COMMERCIAL

## 2018-09-20 ENCOUNTER — HOSPITAL ENCOUNTER (OUTPATIENT)
Dept: PHYSICAL THERAPY | Age: 68
Discharge: HOME | End: 2018-09-20
Payer: COMMERCIAL

## 2018-09-20 PROCEDURE — 97110 THERAPEUTIC EXERCISES: CPT

## 2018-09-20 PROCEDURE — 97140 MANUAL THERAPY 1/> REGIONS: CPT

## 2018-09-20 NOTE — PROGRESS NOTES
PT DAILY TREATMENT NOTE  Patient Name: Vj De La Cruz Date:2018 : 1950 [x]  Patient  Verified Payor: Yoly Becerra / Plan: [employer] HMO / Product Type: HMO /   
In time:5:12  Out time:5:55 Total Treatment Time (min): 43 Total Timed Codes (min): 43 
1:1 Treatment Time (min): 43 Visit #: 26 WC 47 Treatment Area: Lower back pain [M54.5] SUBJECTIVE Pain Level (0-10 scale): 3 Any medication changes, allergies to medications, adverse drug reactions, diagnosis change, or new procedure performed?: [x] No    [] Yes (see summary sheet for update) Subjective functional status/changes:   [] No changes reported Pt reports having to exit her work building for a fire drill and stand outside, which included descending and ascending several flights of stairs in the stairwell. She was in more pain in general for 1-2 days. OBJECTIVE Modality rationale: Pt deferred Min Type Additional Details  
 [] Estim: []Att   []Unatt        []TENS instruct []IFC  []Premod   []NMES []Other:  []w/US   []w/ice   []w/heat Position: Location:  
 []  Traction: [] Cervical       []Lumbar 
                     [] Prone          []Supine []Intermittent   []Continuous Lbs: 
[] before manual 
[] after manual  
 []  Ultrasound: []Continuous   [] Pulsed []1MHz   []3MHz Location: 
W/cm2:  
 []  Iontophoresis with dexamethasone Location: [] Take home patch  
[] In clinic  
 []  Ice     []  heat 
[]  Ice massage Position: Location:  
 []  Vasopneumatic Device Pressure:       [] lo [] med [] hi  
Temperature: [] lo [] med [] hi  
[] Skin assessment post-treatment:  []intact []redness- no adverse reaction 
     []redness  adverse reaction:  
 
 
43 min Therapeutic Exercise:  [] See flow sheet :  
Rationale: increase ROM and increase strength to improve the patients ability to change and maintain body/trunk positions Pain Level (0-10 scale) post treatment: 2 
 
ASSESSMENT/Changes in Function:  
Patient will continue to benefit from skilled PT services to modify and progress therapeutic interventions, address functional mobility deficits, address strength deficits and analyze and cue movement patterns to attain remaining goals. PLAN 
[]  Upgrade activities as tolerated     [x]  Continue plan of care 
[]  Update interventions per flow sheet      
[]  Discharge due to:_ 
[]  Other:_ Vero Bowen 9/20/2018  10:55 AM

## 2018-09-24 ENCOUNTER — HOSPITAL ENCOUNTER (OUTPATIENT)
Dept: PHYSICAL THERAPY | Age: 68
Discharge: HOME | End: 2018-09-24
Payer: COMMERCIAL

## 2018-09-24 PROCEDURE — 97110 THERAPEUTIC EXERCISES: CPT

## 2018-09-24 NOTE — PROGRESS NOTES
PT DAILY TREATMENT NOTE  Patient Name: Wendi Richards Date:2018 : 1950 [x]  Patient  Verified Payor: Cheyenne Navarro / Plan: [employer] HMO / Product Type: HMO /   
In time:3:27  Out time:4:13 Total Treatment Time (min): 46 Total Timed Codes (min): 46 
1:1 Treatment Time (min):   
Visit #: 37 of 21 Treatment Area: Lower back pain [M54.5] SUBJECTIVE Pain Level (0-10 scale): 2-3 Any medication changes, allergies to medications, adverse drug reactions, diagnosis change, or new procedure performed?: [x] No    [] Yes (see summary sheet for update) Subjective functional status/changes:   [] No changes reported Pt reports she tweaked the area near her low back and hip slightly minutes ago while reaching into her car for an umbrella. OBJECTIVE Modality rationale: Pt deferred Min Type Additional Details  
 [] Estim: []Att   []Unatt        []TENS instruct []IFC  []Premod   []NMES []Other:  []w/US   []w/ice   []w/heat Position: Location:  
 []  Traction: [] Cervical       []Lumbar 
                     [] Prone          []Supine []Intermittent   []Continuous Lbs: 
[] before manual 
[] after manual  
 []  Ultrasound: []Continuous   [] Pulsed []1MHz   []3MHz Location: 
W/cm2:  
 []  Iontophoresis with dexamethasone Location: [] Take home patch  
[] In clinic  
 []  Ice     []  heat 
[]  Ice massage Position: Location:  
 []  Vasopneumatic Device Pressure:       [] lo [] med [] hi  
Temperature: [] lo [] med [] hi  
[] Skin assessment post-treatment:  []intact []redness- no adverse reaction 
     []redness  adverse reaction:  
 
 
46 min Therapeutic Exercise:  [] See flow sheet :  
Rationale: increase ROM and increase strength to improve the patients ability to change and maintain body/trunk positions Pain Level (0-10 scale) post treatment: 1-2 
 
 ASSESSMENT/Changes in Function: Reviewed integration of core bracing/contraction with functional ADL's such as lifting, reaching and bending. Pt was encouraged to integrate transversus activation at home with an activity such as lifting a grocery bag or laundry basket. Patient will continue to benefit from skilled PT services to modify and progress therapeutic interventions, address strength deficits and analyze and cue movement patterns to attain remaining goals. PLAN [x]  Upgrade activities as tolerated     []  Continue plan of care 
[]  Update interventions per flow sheet      
[]  Discharge due to:_ 
[]  Other:_ Sarita Councilman, [place] 9/24/2018  11:30 AM

## 2018-09-27 ENCOUNTER — APPOINTMENT (OUTPATIENT)
Dept: PHYSICAL THERAPY | Age: 68
End: 2018-09-27
Payer: COMMERCIAL

## 2018-10-04 ENCOUNTER — APPOINTMENT (OUTPATIENT)
Dept: PHYSICAL THERAPY | Age: 68
End: 2018-10-04
Payer: COMMERCIAL

## 2018-10-08 ENCOUNTER — HOSPITAL ENCOUNTER (OUTPATIENT)
Dept: PHYSICAL THERAPY | Age: 68
Discharge: HOME | End: 2018-10-08
Payer: COMMERCIAL

## 2018-10-08 PROCEDURE — 97110 THERAPEUTIC EXERCISES: CPT

## 2018-10-08 NOTE — PROGRESS NOTES
PT DAILY TREATMENT NOTE  Patient Name: Moises George Date:10/8/2018 : 1950 [x]  Patient  Verified Payor: Conchita Pearl / Plan: [employer] HMO / Product Type: HMO /   
In time:2:00  Out time:2:48 Total Treatment Time (min): 48 Total Timed Codes (min): 48 
1:1 Treatment Time (min): 48 Visit #: 73 ZS 21 Treatment Area: Lower back pain [M54.5] SUBJECTIVE Pain Level (0-10 scale): 3-4 Any medication changes, allergies to medications, adverse drug reactions, diagnosis change, or new procedure performed?: [x] No    [] Yes (see summary sheet for update) Subjective functional status/changes:   [] No changes reported No change to report since previous session. OBJECTIVE Modality rationale: Pt deferred Min Type Additional Details  
 [] Estim: []Att   []Unatt        []TENS instruct []IFC  []Premod   []NMES []Other:  []w/US   []w/ice   []w/heat Position: Location:  
 []  Traction: [] Cervical       []Lumbar 
                     [] Prone          []Supine []Intermittent   []Continuous Lbs: 
[] before manual 
[] after manual  
 []  Ultrasound: []Continuous   [] Pulsed []1MHz   []3MHz Location: 
W/cm2:  
 []  Iontophoresis with dexamethasone Location: [] Take home patch  
[] In clinic  
 []  Ice     []  heat 
[]  Ice massage Position: Location:  
 []  Vasopneumatic Device Pressure:       [] lo [] med [] hi  
Temperature: [] lo [] med [] hi  
[] Skin assessment post-treatment:  []intact []redness- no adverse reaction 
     []redness  adverse reaction:  
 
 
48 min Therapeutic Exercise:  [] See flow sheet :  
Rationale: increase ROM and increase strength to improve the patients ability to change and maintain body/trunk positions Pain Level (0-10 scale) post treatment: 1-2 ASSESSMENT/Changes in Function: Added Paloff press to add standing rotational element to core stability. Pt reported slight soreness in left posterior hip with resisted right rotation. Patient will continue to benefit from skilled PT services to modify and progress therapeutic interventions, address strength deficits and analyze and cue movement patterns to attain remaining goals. PLAN [x]  Upgrade activities as tolerated     []  Continue plan of care 
[]  Update interventions per flow sheet      
[]  Discharge due to:_ 
[]  Other:_ Veor Helms 10/8/2018  10:50 AM

## 2018-10-11 ENCOUNTER — APPOINTMENT (OUTPATIENT)
Dept: PHYSICAL THERAPY | Age: 68
End: 2018-10-11
Payer: COMMERCIAL

## 2018-10-15 ENCOUNTER — HOSPITAL ENCOUNTER (OUTPATIENT)
Dept: PHYSICAL THERAPY | Age: 68
Discharge: HOME | End: 2018-10-15
Payer: COMMERCIAL

## 2018-10-15 PROCEDURE — 97110 THERAPEUTIC EXERCISES: CPT

## 2018-10-15 NOTE — PROGRESS NOTES
[address] [facility] AT [address] [place] [address] [address], [place], [address] [place] [address] Road  Phone: [phone]   [phone] PROGRESS NOTE Patient Name: Michel Richards : 1950 Treatment/Medical Diagnosis: Lower back pain [M54.5] Referral Source: Jodie Rios MD    
Date of Initial Visit: 7/3/18 Attended Visits: 19 Missed Visits: 4 SUMMARY OF TREATMENT:  
Therapeutic exercise for lumbar/LE flexibility, postural awareness/strengthening, lumbopelvic/core stabilization and HEP. CURRENT STATUS: 
Patient reports approximately 55% overall improvement from therapy since initial evaluation with 3/10 pain level on average, increased to 7/10 at the worst with prolonged standing/walking >5 minutes. Pt is making inconsistent progress with LE symptom reduction, but is making slow but steady progress with gaining lumbar and LE mobility/flexibility as advancing with lumbopelvic/core strengthening program with current POC. Pt does report some short term relief following the past few sessions that lasts for up to 30 minutes before symptoms return to the same level. Pt plans to discharge from therapy as discussed with reviewing her overall progress and prognosis after completing the last 2 remaining visits left on current script. Will continue to progress/advance patient within current POC as tolerated with monitoring symptoms. Lumbar AROM= Flexion=85%; Extension=65%:  Side bending= Right=75%, Left=80% Goal/Measure of Progress Goal Met? 1. Patient will report no radicular symptoms for at least 2 weeks to indicate resolution of symptoms. Status at last Eval: Improved, decreased in frequency and intensity, but not fully met Current Status: Improved, decreased in frequency and intensity, but not fully met no 2. Increase FOTO score to 51 for improved overall function.   
Status at last Eval: 40/100 (35/100 at initial evaluation) Current Status: 42/100 no  
 3.  Patient will report no onset of radicular symptoms with ambulation up to 45 minutes to improve her ability to ambulate long community distances. Status at last Eval: Not Met, Progressing Current Status: Not Met, Progressing  no 4. Patient will report =/> 75% overall improvement from therapy since initial evaluation to indicate increased functional improvement with current POC. Status at last Eval: 55% improvement reported Current Status: 55% improvement reported no New Goals to be achieved in __2__  treatments: 1. Patient will report no radicular symptoms for at least 2 weeks to indicate resolution of symptoms. 2.  Increase FOTO score to 51 for improved overall function. 3.  Patient will report no onset of radicular symptoms with ambulation up to 45 minutes to improve her ability to ambulate long community distances. 4.  Patient will report =/> 75% overall improvement from therapy since initial evaluation to indicate increased functional improvement with current POC. RECOMMENDATIONS Continue with current POC for 2 remaining visits left on current script with advancing as tolerated, then reassess for discharge from therapy as planned/discussed. If you have any questions/comments please contact us directly at [phone]. Thank you for allowing us to assist in the care of your patient. LPTA Signature: Bee Parks PTA  Date: 10/15/2018 PT Signature: STEPHANY Helms OCS Time: 1:37 PM  
NOTE TO PHYSICIAN:  PLEASE COMPLETE THE ORDERS BELOW AND FAX TO [facility] at [facility] UNIT: [phone].  
If you are unable to process this request in 24 hours please contact our office: [phone]. 
 
___ I have read the above report and request that my patient continue as recommended.  
___ I have read the above report and request that my patient continue therapy with the following changes/special instructions:_________________________________________________________  
___ I have read the above report and request that my patient be discharged from therapy.   
 
Physician Signature:       Date:      Time:

## 2018-10-15 NOTE — PROGRESS NOTES
PT DAILY TREATMENT NOTE  Patient Name: Moises George Date:10/15/2018 : 1950 [x]  Patient  Verified Payor: Conchita Pearl / Plan: [employer] HMO / Product Type: HMO /   
In time:1:33  Out time:2:28 Total Treatment Time (min): 55 Total Timed Codes (min): 55 
1:1 Treatment Time (min):   
Visit #:  of 21 Treatment Area: Lower back pain [M54.5] SUBJECTIVE Pain Level (0-10 scale): 1/10 Any medication changes, allergies to medications, adverse drug reactions, diagnosis change, or new procedure performed?: [x] No    [] Yes (see summary sheet for update) Subjective functional status/changes:   [] No changes reported I have had my ups and downs with my back and legs, but today is a pretty good day because I didn't have to work today, I came here straight from home, so nothing has aggravated things so far today. OBJECTIVE 55 min Therapeutic Exercise:  [x] See flow sheet :  
Rationale:  increase ROM and increase strength to improve the patients ability to change and maintain body/trunk positions 
       
 min Patient Education: [x] Review HEP    [] Progressed/Changed HEP based on:  
[] positioning   [] body mechanics   [] transfers   [] heat/ice application Other Objective/Functional Measures:  
 
Pain Level (0-10 scale) post treatment: 0 
 
ASSESSMENT/Changes in Function:  
 
Patient will continue to benefit from skilled PT services to modify and progress therapeutic interventions, address functional mobility deficits, address ROM deficits, address strength deficits, analyze and cue movement patterns, analyze and modify body mechanics/ergonomics and assess and modify postural abnormalities to attain remaining goals. []  See Plan of Care [x]  See progress note/recertification 
[]  See Discharge Summary Progress towards goals / Updated goals: 
See Progress note/Physician update for full detailed progress towards established goals.  
 
PLAN 
 [x]  Upgrade activities as tolerated     []  Continue plan of care 
[]  Update interventions per flow sheet      
[]  Discharge due to:_ 
[]  Other:_ Susan Bess PTA 10/15/2018  1:27 PM

## 2018-10-22 ENCOUNTER — APPOINTMENT (OUTPATIENT)
Dept: PHYSICAL THERAPY | Age: 68
End: 2018-10-22
Payer: COMMERCIAL

## 2018-10-30 NOTE — PROGRESS NOTES
[address] AT [address] [facility] [address], [place], [address] [place] [address] Road  Phone: [phone]   [phone] DISCHARGE SUMMARY Patient Name: Naman Barrios : 1950 Treatment/Medical Diagnosis: Lower back pain [M54.5] Referral Source: Anthony Kwong MD    
Date of Initial Visit: 7/3/18 Attended Visits: 19 Missed Visits: 2 SUMMARY OF TREATMENT Therapeutic exercise for lumbar/LE flexibility, postural awareness/strengthening, lumbopelvic/core stabilization and HEP. CURRENT STATUS Pt attended 19 visits for radicular low back pain, and reported 55% improvement (please refer to progress note from last visit on 10/15/18 for further details). The patient did not return to therapy after that and is being discharged with most recent details outlined below: 
 
Lumbar AROM= Flexion=85%; Extension=65%:  Side bending= Right=75%, Left=80% 
  
       
Goal/Measure of Progress Goal Met? 1. Patient will report no radicular symptoms for at least 2 weeks to indicate resolution of symptoms. Status at last Eval: Improved, decreased in frequency and intensity, but not fully met Current Status: Improved, decreased in frequency and intensity, but not fully met no 2. Increase FOTO score to 51 for improved overall function. Status at last Eval: 40/100 (35/100 at initial evaluation) Current Status: 42/100 no 3. Patient will report no onset of radicular symptoms with ambulation up to 45 minutes to improve her ability to ambulate long community distances. Status at last Eval: Not Met, Progressing Current Status: Not Met, Progressing  no 4. Patient will report =/> 75% overall improvement from therapy since initial evaluation to indicate increased functional improvement with current POC. Status at last Eval: 55% improvement reported Current Status: 55% improvement reported no  
  
 
RECOMMENDATIONS Discharge from therapy. Patient has progressed partially, but plateaued with improvements. If you have any questions/comments please contact us directly at [phone]. Thank you for allowing us to assist in the care of your patient. Therapist Signature: Lesvia Borjas PT, MIGUEL ANGEL Date: 10/30/18   Time: 9:16 AM

## 2021-05-20 ENCOUNTER — HOSPITAL ENCOUNTER (OUTPATIENT)
Dept: PHYSICAL THERAPY | Age: 71
Discharge: HOME | End: 2021-05-20

## 2021-06-02 ENCOUNTER — HOSPITAL ENCOUNTER (OUTPATIENT)
Dept: PHYSICAL THERAPY | Age: 71
End: 2021-06-02
Payer: COMMERCIAL

## 2021-06-03 ENCOUNTER — HOSPITAL ENCOUNTER (OUTPATIENT)
Dept: PHYSICAL THERAPY | Age: 71
End: 2021-06-03
Payer: COMMERCIAL

## 2021-06-07 ENCOUNTER — HOSPITAL ENCOUNTER (OUTPATIENT)
Dept: PHYSICAL THERAPY | Age: 71
Discharge: HOME | End: 2021-06-07
Payer: COMMERCIAL

## 2021-06-07 PROCEDURE — 97162 PT EVAL MOD COMPLEX 30 MIN: CPT

## 2021-06-07 PROCEDURE — 97535 SELF CARE MNGMENT TRAINING: CPT

## 2021-06-07 PROCEDURE — 97110 THERAPEUTIC EXERCISES: CPT

## 2021-06-07 NOTE — PROGRESS NOTES
[address] [facility]ICAL [facility]ERAP[facility]  [address] [place] [address] [address], [address],[place], [address] [facility] [address] [facility] [address] - Phone: [phone]  Fax: [phone] / [address]  Patient Name: Lázaro Crowder : 1950   Medical   Diagnosis: LS radiculopathy Treatment Diagnosis: Low back pain [M54.5]   Onset Date: 4 years ago     Referral Source: Zeferino Smallwood MD Start of Care [facility]): 2021   Prior Hospitalization: See medical history Provider #: [id number]   Prior Level of Function: Previous no difficulty with amb/stand/sit prolonged; las 6 months increase in pain further reducing tolerance to stationary positions or ambulation   Comorbidities: DM, HTN, stroke   Medications: Verified on Patient Summary List   The Plan of Care and following information is based on the information from the initial evaluation.   ===========================================================================================  Assessment / key information:  Lázaro Crowder is a 72 y.o.  yo female with Dx: low back pain/LS radiculopathy, who reports over 4 years ago of insidious onset. Pt rates pain as 8/10 max, 1/10 at best, 5/10 today located bilat post/lat hips and pain down to bilat mid tibia along lateral aspects of thigh. Pt reports main difficulty is sitting/standing/walking for prolonged periods with dragging of left LE when fatigued due to stroke years ago causing some dorsiflexion weakness. Reports using cane now consistently due to this and feeling unsteady on her feet as well. Long car rides also causing increased LE pain and fatigue of legs. Objective: FOTO score = 35 (an established functional score where 100 = no disability). Pt amb into clinic with SPC in right UE. Gait shows slightly compensated Trendelenberg pattern on left with side TERRANCE and more pronation on left and valgus/IR of left LE compared to right.   Pt unable to SL balance for any amouunt of time without sway or need for UE support. Pt requesting min assist to supine to sit and noted labored transfers sit to supine and bed mobility. Special tests: Slump right pain in LS, left increased pain in post Left LE; SLR right post LE pain, left + for LS pain; Strength shows reduced bilat hip flex, reduced left ext caba,  Slightly reduced left ant tib, hip abd rieded on right, slightly reduced on left, hip ext slightly reduced right, markedly reduced on left; LS ROM 75% (hands to mid tibia) with increased LS pain; CARINA showing reduced hip ER ROM on left compared to right. Palpation shows tenderness at right > left L2-4 and cnetral L4-5. Pt instructed in HEP and will f/u in clinic for PT.  ===========================================================================================  Eval Complexity: History MEDIUM  Complexity : 1-2 comorbidities / personal factors will impact the outcome/ POC ;  Examination  HIGH Complexity : 4+ Standardized tests and measures addressing body structure, function, activity limitation and / or participation in recreation ; Presentation MEDIUM Complexity : Evolving with changing characteristics ;   Decision Making MEDIUM Complexity : FOTO score of 26-74; Overall Complexity MEDIUM  Problem List: pain affecting function, decrease ROM, decrease strength, impaired gait/ balance, decrease ADL/ functional abilitiies, decrease activity tolerance and decrease flexibility/ joint mobility FOTO = 35  Treatment Plan may include any combination of the following: Therapeutic exercise, Therapeutic activities, Neuromuscular re-education, Physical agent/modality, Gait/balance training, Manual therapy, Aquatic therapy, Patient education and Self Care training  Patient / Family readiness to learn indicated by: asking questions, trying to perform skills and interest  Persons(s) to be included in education: patient (P)  Barriers to Learning/Limitations: no  Measures taken, if barriers to learning: N/A Patient Goal (s): \"decrease pain, improve mobility\"   Patient self reported health status: fair  Rehabilitation Potential: good   Short Term Goals: To be accomplished in  1-2  weeks:  1. Independent with HEP. 2. Decrease max pain 25-50% to assist with amb/stand > 10 min.  Long Term Goals: To be accomplished in  4-6  weeks:  1. Decrease max pain 50-75% to assist with amb/stand > 20 min. 2.  Improve FOTO Functional Status Score by 15 points in order to show significant functional improvement. 3.  Will rate a +5 on Global Rating of Change and be prepared to DC to HEP. Frequency / Duration:   Patient to be seen  2-3  times per week for 4-6  weeks:  Patient / Caregiver education and instruction: self care and exercises  Therapist Signature: Milind George PT, DPT, OCS, CSCS Date: 3/5/2266   Certification Period: NA Time: 2:12 PM   ===========================================================================================  I certify that the above Physical Therapy Services are being furnished while the patient is under my care. I agree with the treatment plan and certify that this therapy is necessary. Physician Signature:        Date:       Time:                                         Kathy Sylvester MD  Please sign and return to [facility] or you may fax the signed copy to [phone]. Thank you.

## 2021-06-07 NOTE — PROGRESS NOTES
PHYSICAL THERAPY - DAILY TREATMENT NOTE    Patient Name: Kelsey Vargas        Date: 2021  : 1950   YES Patient  Verified  Visit #:      12  Insurance: Payor: Marty Maurice / Plan: [employer] HMO/CHOICE PLUS/POS / Product Type: HMO /      In time: 2:10 Out time: 3:07   Total Treatment Time: 57     Medicare/[employer] Time Tracking (below)   Total Timed Codes (min):  na 1:1 Treatment Time:  na     TREATMENT AREA =  LS rad. SUBJECTIVE    Pain Level (on 0 to 10 scale):  5  / 10   Medication Changes/New allergies or changes in medical history, any new surgeries or procedures? NO    If yes, update Summary List   Subjective Functional Status/Changes:  []  No changes reported     See POC          OBJECTIVE    20 min Therapeutic Exercise:  [x]  See flow sheet   Rationale:      increase ROM, increase strength and improve balance to improve the patients ability to amb/stand prol.      12 min Self Care: Reviewed diagnosis, prognosis, therapy progression   Rationale:    Improve understanding of injury and therapy to have realistic expectation of therapy to improve compliance/adherence and satisfaction    Billed With/As:   [x] TE   [] TA   [] Neuro   [x] Self Care Patient Education: [x] Review HEP    [] Progressed/Changed HEP based on:   [] positioning   [] body mechanics   [] transfers   [] heat/ice application    [] other:        Other Objective/Functional Measures:    Shown and performed HEP     Post Treatment Pain Level (on 0 to 10) scale:   5 / 10     ASSESSMENT  Assessment/Changes in Function:     See POC     []  See Progress Note/Recertification   Patient will continue to benefit from skilled PT services to modify and progress therapeutic interventions, address functional mobility deficits, address ROM deficits, address strength deficits, analyze and address soft tissue restrictions, analyze and cue movement patterns and analyze and modify body mechanics/ergonomics to attain goals per POC.    Progress toward goals / Updated goals:    See POC     PLAN  [x]  Upgrade activities as tolerated YES Continue plan of care   []  Discharge due to :    []  Other:      Therapist: Sylvia Mortimer PT, DPT, OCS, CSCS    Date: 6/7/2021 Time: 2:12 PM

## 2021-06-16 ENCOUNTER — APPOINTMENT (OUTPATIENT)
Dept: PHYSICAL THERAPY | Age: 71
End: 2021-06-16
Payer: COMMERCIAL

## 2021-06-25 ENCOUNTER — HOSPITAL ENCOUNTER (OUTPATIENT)
Dept: PHYSICAL THERAPY | Age: 71
Discharge: HOME | End: 2021-06-25
Payer: COMMERCIAL

## 2021-06-25 PROCEDURE — 97530 THERAPEUTIC ACTIVITIES: CPT

## 2021-06-25 PROCEDURE — 97110 THERAPEUTIC EXERCISES: CPT

## 2021-06-25 NOTE — PROGRESS NOTES
PHYSICAL THERAPY - DAILY TREATMENT NOTE    Patient Name: Denisse Skaggs        Date: 2021  : 1950   yes Patient  Verified  Visit #:      of   12  Insurance: Payor: Doris Navarro / Plan: [employer] HMO/CHOICE PLUS/POS / Product Type: HMO /      In time: 11:10 Out time: 11:52   Total Treatment Time: 42     Medicare/[facility] Time Tracking (below)   Total Timed Codes (min):  n/a 1:1 Treatment Time:  n/a     TREATMENT AREA =  Low back pain [M54.5]    SUBJECTIVE  Pain Level (on 0 to 10 scale):  8  / 10   Medication Changes/New allergies or changes in medical history, any new surgeries or procedures?    no  If yes, update Summary List   Subjective Functional Status/Changes:  []  No changes reported     Patient reports more pain this morning because she is actually up and moving more than she normally would. Also reports compliance with HEP. OBJECTIVE  30 min Therapeutic Exercise:  [x]  See flow sheet   Rationale:      increase ROM and increase strength to improve the patients ability to perform prolonged sitting, standing, and walking activities     12 min Therapeutic Activity: [x]  See flow sheet; education on log rolling technique and bed mobility    Rationale:    increase ROM and improve coordination to improve the patients ease with bed transfers with decrease L/S strain. Billed With/As:   [x] TE   [] TA   [] Neuro   [] Self Care Patient Education: [x] Review HEP    [] Progressed/Changed HEP based on:   [] positioning   [] body mechanics   [] transfers   [] heat/ice application    [] other:      Other Objective/Functional Measures:    *initiated therex per POC (see flowsheet) to improve functional mobility, strength, stability, and endurance for functional ADLs. Req'd cues 100 % of therex for proper form/technique due to 1st F/U appt. *demo increase fatigue with L>R LE strengthening exercises. Pt reports CVA in ~ causing L LE weakness.  Previously wore AFO on L LE to assist with foot drop  *Significant difficulty with log rolling technique due to weakness. Required head rest to be raised to allow pt to push herself up. Post Treatment Pain Level (on 0 to 10) scale:   6  / 10     ASSESSMENT  Assessment/Changes in Function:     Patient noted reduced pain level following PT session indicating good response to initial f/u appt. Would continue to benefit from skilled PT in order to improve functional mobility, flexibility, and strength to complete ADLs      []  See Progress Note/Recertification   Patient will continue to benefit from skilled PT services to modify and progress therapeutic interventions, address functional mobility deficits, address ROM deficits, address strength deficits, analyze and address soft tissue restrictions, analyze and modify body mechanics/ergonomics and assess and modify postural abnormalities to attain remaining goals. Progress toward goals / Updated goals: · Short Term Goals: To be accomplished in  1-2  weeks:  1. Independent with HEP. Met 06/25; reports HEP compliance  2. Decrease max pain 25-50% to assist with amb/stand > 10 min. · Long Term Goals: To be accomplished in  4-6  weeks:  1. Decrease max pain 50-75% to assist with amb/stand > 20 min. 2.  Improve FOTO Functional Status Score by 15 points in order to show significant functional improvement. 3.  Will rate a +5 on Global Rating of Change and be prepared to DC to HEP.        PLAN  [x]  Upgrade activities as tolerated yes Continue plan of care   []  Discharge due to :    []  Other:      Therapist: Diana English    Date: 6/25/2021 Time: 11:57 AM     Future Appointments   Date Time Provider Hussein Saul   6/25/2021 11:00 AM Corie Ackerman   6/30/2021 11:45 AM Storm Bunting [address]   7/2/2021  2:15 PM Storm Bunting [facility]   7/6/2021 12:30 PM Storm Bunting [address] [facility]   7/8/2021  1:15 PM Storm Bunting [address] [facility]   7/13/2021 12:00 PM Christina Ram Alba Hernandez [facility]   7/15/2021  1:15 PM Celso Navarro [address] [facility]   7/20/2021 12:30 PM Celso Navarro [address] [facility]   7/22/2021  1:15 PM Celso Sang [address] [facility]

## 2021-06-30 ENCOUNTER — HOSPITAL ENCOUNTER (OUTPATIENT)
Dept: PHYSICAL THERAPY | Age: 71
Discharge: HOME | End: 2021-06-30
Payer: COMMERCIAL

## 2021-06-30 PROCEDURE — 97110 THERAPEUTIC EXERCISES: CPT

## 2021-06-30 PROCEDURE — 97112 NEUROMUSCULAR REEDUCATION: CPT

## 2021-06-30 NOTE — PROGRESS NOTES
PHYSICAL THERAPY - DAILY TREATMENT NOTE    Patient Name: Natacha Herrera        Date: 2021  : 1950   yes Patient  Verified  Visit #:   3   of   12  Insurance: Payor: Pete Weber / Plan: [employer] HMO/CHOICE PLUS/POS / Product Type: HMO /      In time: 11:43 Out time: 12:28   Total Treatment Time: 45     Medicare/[facility] Time Tracking (below)   Total Timed Codes (min):  n/a 1:1 Treatment Time:  n/a     TREATMENT AREA =  Low back pain [M54.5]    SUBJECTIVE  Pain Level (on 0 to 10 scale):  5  / 10   Medication Changes/New allergies or changes in medical history, any new surgeries or procedures?    no  If yes, update Summary List   Subjective Functional Status/Changes:  []  No changes reported     Patient reports having more R > L LE pain. States that she was sore after LV because of the new exercises. Took 2 ibuprofen and felt better the following day. OBJECTIVE  35 min Therapeutic Exercise:  [x]  See flow sheet   Rationale:      increase ROM and increase strength to improve the patients ability to perform prolonged sitting, standing, and walking activities     10 min Neuromuscular Re-ed: [x]  See flow sheet   Rationale:    increase ROM, increase strength, improve coordination, improve balance and increase proprioception to improve the patients ability to perform walking activities within the community with decrease fall risk. Billed With/As:   [x] TE   [] TA   [] Neuro   [] Self Care Patient Education: [x] Review HEP    [] Progressed/Changed HEP based on:   [] positioning   [] body mechanics   [] transfers   [] heat/ice application    [] other:      Other Objective/Functional Measures:    *progressed to SB #1 from ab draw to improve core activation  *increase hold time for incline board and HS stretch to improve flexibility  *added sit to stands and step taps to improve functional strength. Demo increase R LE weight shift with sit to stands.  Cues for staggered stance to facilitate more L LE activation. Post Treatment Pain Level (on 0 to 10) scale:   5-6 / 10     ASSESSMENT  Assessment/Changes in Function:     Patient noted more soreness in the R LE following today's visit. Denied radicular symptoms. Educated pt on DOMS, due to subjective statement, noting this is a normal response, and to continue perform HEP stretches as well as utilizing ice to reduce c/o soreness. Patient acknowledged understanding. []  See Progress Note/Recertification   Patient will continue to benefit from skilled PT services to modify and progress therapeutic interventions, address functional mobility deficits, address ROM deficits, address strength deficits, analyze and address soft tissue restrictions, analyze and modify body mechanics/ergonomics and assess and modify postural abnormalities to attain remaining goals. Progress toward goals / Updated goals: · Short Term Goals: To be accomplished in  1-2  weeks:  1. Independent with HEP. Met 06/25; reports HEP compliance  2. Decrease max pain 25-50% to assist with amb/stand > 10 min. · Long Term Goals: To be accomplished in  4-6  weeks:  1. Decrease max pain 50-75% to assist with amb/stand > 20 min. 2.  Improve FOTO Functional Status Score by 15 points in order to show significant functional improvement. 3.  Will rate a +5 on Global Rating of Change and be prepared to DC to HEP.        PLAN  [x]  Upgrade activities as tolerated yes Continue plan of care   []  Discharge due to :    []  Other:      Therapist: Hayden Cabrera    Date: 6/30/2021 Time: 1:06 PM     Future Appointments   Date Time Provider Hussein Saul   6/30/2021 11:45 AM Josiephine [place] [facility]   7/2/2021  2:15 PM Josiephine [place] [facility]   7/6/2021 12:30 PM Josiephine Aranza [facility]   7/8/2021  1:15 PM Josiephine [place] [facility]   7/13/2021 12:00 PM [facility]   7/15/2021  1:15 PM [facility]   7/20/2021 12:30 PM Miguel Duque WILMAR [address] [facility] [address]   7/22/2021  1:15 PM Ivan Jha [address] [facility] [address]

## 2021-07-02 ENCOUNTER — APPOINTMENT (OUTPATIENT)
Dept: PHYSICAL THERAPY | Age: 71
End: 2021-07-02
Payer: COMMERCIAL

## 2021-07-06 ENCOUNTER — HOSPITAL ENCOUNTER (OUTPATIENT)
Dept: PHYSICAL THERAPY | Age: 71
Discharge: HOME | End: 2021-07-06
Payer: COMMERCIAL

## 2021-07-06 PROCEDURE — 97535 SELF CARE MNGMENT TRAINING: CPT

## 2021-07-06 PROCEDURE — 97110 THERAPEUTIC EXERCISES: CPT

## 2021-07-06 PROCEDURE — 97112 NEUROMUSCULAR REEDUCATION: CPT

## 2021-07-06 NOTE — PROGRESS NOTES
[address] [place] [address] PHYSICAL THERAPY  [address] [place] [address], [facility] [address],[facility], [facility] - Phone: [phone]  Fax: [phone]  PROGRESS NOTE  Patient Name: Leticia Velez : 1950   Treatment/Medical Diagnosis: Low back pain [M54.5]   Referral Source: Wong Queen MD     Date of Initial Visit: 21 Attended Visits: 4 Missed Visits: 0     SUMMARY OF TREATMENT  Patient has attended 4 PT sessions, including an initial evaluation, for low back pain. PT interventions have focused on improving functional flexibility, mobility, strength, and stability with therapeutic exercises, patient education, and HEP. CURRENT STATUS  Patient has made slow progress with PT interventions for low back pain due to limited PT attendance. Gap in PT attendance from  to  due to scheduling conflicts. In the last 2 weeks, pain has ranged between 4-9/10. Pain is primarily described as sharp, stabbing in the back the L hip and low back. Notes elevated pain with walking and standing > 5 minutes. Able to alleviate symptoms with rest. Notes improved ease with sit to stands from low surfaces. Denies any radicular symptoms since initial evaluation. Current objective findings: transfer ability: balance assessment: tandem stance L 28 seconds, R 30 seconds, SLS with contralateral LE on 8 inch box and no use of UE (B) 30 seconds; sit to stands without UE (19 inches height): 12 repetitions with hands on knees; gait analysis: decrease L hip extension, decrease L heel toe gait pattern, short stride    Goal/Measure of Progress Goal Met? 1. Decrease max pain 50-75% to assist with amb/stand > 20 min. Status at last Eval: 8/10 Current Status: 9/10  no   2. Improve FOTO Functional Status Score by 15 points in order to show significant functional improvement. Status at last Eval: 35/100 Current Status: 36/100 no   3. Will rate a +5 on Global Rating of Change and be prepared to DC to HEP. Status at last Eval: Goal established Current Status: +2 Slowly progressing     New Goals to be achieved in __4__  weeks:  1. Decrease max pain 50-75% to assist with walking/standing activities. 2.  Patient will report >/= 10 minutes of standing/walking without increase in low back symptoms to indicate improved functional mobility tolerance. 3.  Will rate a +5 on Global Rating of Change and be prepared to DC to HEP. RECOMMENDATIONS  Patient would continue to benefit from skilled PT for 2x/week for 4 weeks to continue to improve functional flexibility, mobility, strength, and stability to complete functional ADLs, household activities, and work activities. Thank you for this referral.     If you have any questions/comments please contact us directly at [phone]. Thank you for allowing us to assist in the care of your patient. Therapist Signature: DANIEL Schofield Date: 07/07/07    Radha Mina PT DPT [facility] Time: 9:15 AM   NOTE TO PHYSICIAN:  PLEASE COMPLETE THE ORDERS BELOW AND FAX TO   [facility]: [phone]  If you are unable to process this request in 24 hours please contact our office: [phone]    ___ I have read the above report and request that my patient continue as recommended.   ___ I have read the above report and request that my patient continue therapy with the following changes/special instructions:_________________________________________________________   ___ I have read the above report and request that my patient be discharged from therapy.      Physician Signature:        Date:       Time:                                 Sanjay Murray MD

## 2021-07-06 NOTE — PROGRESS NOTES
PHYSICAL THERAPY - DAILY TREATMENT NOTE    Patient Name: Natacha Herrera        Date: 2021  : 1950   yes Patient  Verified  Visit #:   4   of   12  Insurance: Payor: Pete Weber / Plan: [employer] HMO/CHOICE PLUS/POS / Product Type: HMO /      In time: 12:29 Out time: 1:20   Total Treatment Time: 51     Medicare/[employer] Time Tracking (below)   Total Timed Codes (min):  n/a 1:1 Treatment Time:  n/a     TREATMENT AREA =  Low back pain [M54.5]    SUBJECTIVE  Pain Level (on 0 to 10 scale):  5 / 10   Medication Changes/New allergies or changes in medical history, any new surgeries or procedures?    no  If yes, update Summary List   Subjective Functional Status/Changes:  []  No changes reported     Patient reports feeling a little more unsteady today for no particular reason. States that she did have to stand for a long period of time later last week, but rested over the weekend. Notes most pain in the legs. Denies any falls or red flags since LV. SEE PN       OBJECTIVE  20 min Therapeutic Exercise:  [x]  See flow sheet   Rationale:      increase ROM and increase strength to improve the patients ability to perform prolonged sitting, standing, and walking activities     21 min Neuromuscular Re-ed: [x]  See flow sheet   Rationale:    increase ROM, increase strength, improve coordination, improve balance and increase proprioception to improve the patients ability to perform walking activities within the community with decrease fall risk. 10 min Self Care: Reassessment. Issued and reviewed updated HEP to include standing exercises.     Rationale:  to improve understanding of current diagnosis with realistic expectation of PT to improve compliance/adherence and satisfaction        Billed With/As:   [x] TE   [] TA   [] Neuro   [] Self Care Patient Education: [x] Review HEP    [] Progressed/Changed HEP based on:   [] positioning   [] body mechanics   [] transfers   [] heat/ice application [x] other: [employer] Access Code: [email or web address]      Other Objective/Functional Measures:    SEE PN     Post Treatment Pain Level (on 0 to 10) scale:   4 / 10     ASSESSMENT  Assessment/Changes in Function:     SEE PN     []  See Progress Note/Recertification   Patient will continue to benefit from skilled PT services to modify and progress therapeutic interventions, address functional mobility deficits, address ROM deficits, address strength deficits, analyze and address soft tissue restrictions, analyze and modify body mechanics/ergonomics and assess and modify postural abnormalities to attain remaining goals.    Progress toward goals / Updated goals:    SEE PN       PLAN  [x]  Upgrade activities as tolerated yes Continue plan of care   []  Discharge due to :    []  Other:      Therapist: DANIEL Rubio    Date: 7/6/2021 Time: 1:59 PM     Future Appointments   Date Time Provider Hussein Saul   7/6/2021 12:30 PM Bess Harry [facility]   7/8/2021  1:15 PM Bess Harry [facility]   7/13/2021 12:00 PM Bess Harry [facility]   7/15/2021  1:15 PM [facility]   7/20/2021 12:30 PM [facility]   7/22/2021  1:15 PM Hermelinda Potter

## 2021-07-08 ENCOUNTER — APPOINTMENT (OUTPATIENT)
Dept: PHYSICAL THERAPY | Age: 71
End: 2021-07-08
Payer: COMMERCIAL

## 2021-07-13 ENCOUNTER — HOSPITAL ENCOUNTER (OUTPATIENT)
Dept: PHYSICAL THERAPY | Age: 71
Discharge: HOME | End: 2021-07-13
Payer: COMMERCIAL

## 2021-07-13 PROCEDURE — 97110 THERAPEUTIC EXERCISES: CPT

## 2021-07-13 PROCEDURE — 97112 NEUROMUSCULAR REEDUCATION: CPT

## 2021-07-13 NOTE — PROGRESS NOTES
PHYSICAL THERAPY - DAILY TREATMENT NOTE    Patient Name: Malathi Altamirano        Date: 2021  : 1950   yes Patient  Verified  Visit #:     Insurance: Payor: Micky Cuevas / Plan: [employer] HMO/CHOICE PLUS/POS / Product Type: HMO /      In time: 12:05 Out time: 12:45   Total Treatment Time: 40     Medicare/[facility] Time Tracking (below)   Total Timed Codes (min):  n/a 1:1 Treatment Time:  n/a     TREATMENT AREA =  Low back pain [M54.5]    SUBJECTIVE  Pain Level (on 0 to 10 scale): 8 / 10   Medication Changes/New allergies or changes in medical history, any new surgeries or procedures?    no  If yes, update Summary List   Subjective Functional Status/Changes:  []  No changes reported     \"I'm having a really bad flare up today. I've got it in my R hip and intense, tingling in the L thigh. \" Denies any prolonged functional mobility activities to exacerbate the symptoms today. States the Gabapentin has helped with the pain at night but will take ibuprofen during days like today to help with the pain. OBJECTIVE  25 min Therapeutic Exercise:  [x]  See flow sheet   Rationale:      increase ROM and increase strength to improve the patients ability to perform prolonged sitting, standing, and walking activities     15 min Neuromuscular Re-ed: [x]  See flow sheet   Rationale:    increase ROM, increase strength, improve coordination, improve balance and increase proprioception to improve the patients ability to perform walking activities within the community with decrease fall risk.        Billed With/As:   [x] TE   [] TA   [] Neuro   [] Self Care Patient Education: [x] Review HEP    [] Progressed/Changed HEP based on:   [] positioning   [] body mechanics   [] transfers   [] heat/ice application    [x] other: [employer] Access Code: [email or web address]      Other Objective/Functional Measures:    *initiated PT session with Nu[employer] followed by table exercises then standing exercises  *performed sit to stands with hands on knees   *added side stepping in parallel bars to improve LE strength      Post Treatment Pain Level (on 0 to 10) scale:   5 / 10     ASSESSMENT  Assessment/Changes in Function:     Despite elevated pain upon arrival of PT session, pt noted reduced pain following PT session and improved transfer mobility compared to previous PT sessions. []  See Progress Note/Recertification   Patient will continue to benefit from skilled PT services to modify and progress therapeutic interventions, address functional mobility deficits, address ROM deficits, address strength deficits, analyze and address soft tissue restrictions, analyze and modify body mechanics/ergonomics and assess and modify postural abnormalities to attain remaining goals. Progress toward goals / Updated goals:    New Goals to be achieved in __4__  weeks:  1.  Decrease max pain 50-75% to assist with walking/standing activities. 2.  Patient will report >/= 10 minutes of standing/walking without increase in low back symptoms to indicate improved functional mobility tolerance. 3.  Will rate a +5 on Global Rating of Change and be prepared to DC to HEP.        PLAN  [x]  Upgrade activities as tolerated yes Continue plan of care   []  Discharge due to :    []  Other:      Therapist: Acey Schirmer, LPTA    Date: 7/13/2021 Time: 1:16 PM     Future Appointments   Date Time Provider Hussein Saul   7/13/2021 12:00 PM Armando Shaw [facility]   7/15/2021  1:15 PM Sameul Flicker [facility]   7/22/2021  1:15 PM Sameul Flicker [facility]   7/27/2021 12:00 PM Armando Shaw [facility]   7/29/2021 10:45 AM Sameul Flicker [facility]   8/2/2021 11:00 AM Sameul Flicker [facility]   8/4/2021 11:00 AM Sameul [facility]

## 2021-07-15 ENCOUNTER — HOSPITAL ENCOUNTER (OUTPATIENT)
Dept: PHYSICAL THERAPY | Age: 71
Discharge: HOME | End: 2021-07-15
Payer: COMMERCIAL

## 2021-07-15 PROCEDURE — 97112 NEUROMUSCULAR REEDUCATION: CPT

## 2021-07-15 PROCEDURE — 97110 THERAPEUTIC EXERCISES: CPT

## 2021-07-15 NOTE — PROGRESS NOTES
PHYSICAL THERAPY - DAILY TREATMENT NOTE    Patient Name: Brent Belcher        Date: 7/15/2021  : 1950   yes Patient  Verified  Visit #:     Insurance: Payor: Lui Sharma / Plan: [employer] HMO/CHOICE PLUS/POS / Product Type: HMO /      In time: 1:16 Out time: 2:10   Total Treatment Time: 54     Medicare/[facility] Time Tracking (below)   Total Timed Codes (min):  n/a 1:1 Treatment Time:  n/a     TREATMENT AREA =  Low back pain [M54.5]    SUBJECTIVE  Pain Level (on 0 to 10 scale): 6 / 10   Medication Changes/New allergies or changes in medical history, any new surgeries or procedures?    no  If yes, update Summary List   Subjective Functional Status/Changes:  []  No changes reported     Patient reports doing better today than LV. States that she has been doing the bed exercises more at home. Denies any LE radicular symptoms today. OBJECTIVE  14 min Therapeutic Exercise:  [x]  See flow sheet   Rationale:      increase ROM and increase strength to improve the patients ability to perform prolonged sitting, standing, and walking activities     40 min Neuromuscular Re-ed: [x]  See flow sheet   Rationale:    increase ROM, increase strength, improve coordination, improve balance and increase proprioception to improve the patients ability to perform walking activities within the community with decrease fall risk.        Billed With/As:   [x] TE   [] TA   [] Neuro   [] Self Care Patient Education: [x] Review HEP    [] Progressed/Changed HEP based on:   [] positioning   [] body mechanics   [] transfers   [] heat/ice application    [x] other: [employer] Access Code: [email or web address]      Other Objective/Functional Measures:    *added step ups to improve LE strength  *added tandem walking in parallel bars to improve LE proprioception       Post Treatment Pain Level (on 0 to 10) scale:   4 / 10     ASSESSMENT  Assessment/Changes in Function:     Patient noted reduced pain following PT session indicating good response today's PT session. Encouraged pt to continue with bed exercises. []  See Progress Note/Recertification   Patient will continue to benefit from skilled PT services to modify and progress therapeutic interventions, address functional mobility deficits, address ROM deficits, address strength deficits, analyze and address soft tissue restrictions, analyze and modify body mechanics/ergonomics and assess and modify postural abnormalities to attain remaining goals. Progress toward goals / Updated goals:    New Goals to be achieved in __4__  weeks:  1.  Decrease max pain 50-75% to assist with walking/standing activities. slowly progressing 07/15 indicated pre vs post tx pain levels  2.  Patient will report >/= 10 minutes of standing/walking without increase in low back symptoms to indicate improved functional mobility tolerance. 3.  Will rate a +5 on Global Rating of Change and be prepared to DC to HEP.        PLAN  [x]  Upgrade activities as tolerated yes Continue plan of care   []  Discharge due to :    []  Other:      Therapist: DANIEL Lance    Date: 7/15/2021 Time: 3:14 PM     Future Appointments   Date Time Provider Hussein Saul   7/22/2021  1:15 PM Roxy Rey [facility]   7/27/2021 12:00 PM Roxy PERDUE [facility]   7/29/2021 10:45 AM Asael [address]   8/2/2021 11:00 AM Asael [address] [facility]   8/4/2021 11:00 AM [address] [facility]

## 2021-07-20 ENCOUNTER — APPOINTMENT (OUTPATIENT)
Dept: PHYSICAL THERAPY | Age: 71
End: 2021-07-20
Payer: COMMERCIAL

## 2021-07-22 ENCOUNTER — APPOINTMENT (OUTPATIENT)
Dept: PHYSICAL THERAPY | Age: 71
End: 2021-07-22
Payer: COMMERCIAL

## 2021-07-27 ENCOUNTER — HOSPITAL ENCOUNTER (OUTPATIENT)
Dept: PHYSICAL THERAPY | Age: 71
Discharge: HOME | End: 2021-07-27
Payer: COMMERCIAL

## 2021-07-27 NOTE — PROGRESS NOTES
PHYSICAL THERAPY - DAILY TREATMENT NOTE    Patient Name: Dom Born        Date: 2021  : 1950   yes Patient  Verified  Visit #:     Insurance: Payor: [employer] / Plan: [employer] HMO/CHOICE PLUS/POS / Product Type: HMO /      In time: *** Out time: ***   Total Treatment Time: ***     Medicare/[employer] Time Tracking (below)   Total Timed Codes (min):  n/a 1:1 Treatment Time:  n/a     TREATMENT AREA =  Low back pain [M54.5]    SUBJECTIVE  Pain Level (on 0 to 10 scale): *** / 10   Medication Changes/New allergies or changes in medical history, any new surgeries or procedures?    no  If yes, update Summary List   Subjective Functional Status/Changes:  []  No changes reported     ***       OBJECTIVE  *** min Therapeutic Exercise:  [x]  See flow sheet   Rationale:      increase ROM and increase strength to improve the patients ability to perform prolonged sitting, standing, and walking activities     ***  min Neuromuscular Re-ed: [x]  See flow sheet   Rationale:    increase ROM, increase strength, improve coordination, improve balance and increase proprioception to improve the patients ability to perform walking activities within the community with decrease fall risk. Billed With/As:   [x] TE   [] TA   [] Neuro   [] Self Care Patient Education: [x] Review HEP    [] Progressed/Changed HEP based on:   [] positioning   [] body mechanics   [] transfers   [] heat/ice application    [x] other: [employer] Access Code: [email or web address]      Other Objective/Functional Measures:    *added step ups to improve LE strength  *added tandem walking in parallel bars to improve LE proprioception       Post Treatment Pain Level (on 0 to 10) scale:   4 / 10     ASSESSMENT  Assessment/Changes in Function:     Patient noted reduced pain following PT session indicating good response today's PT session. Encouraged pt to continue with bed exercises.       []  See Progress Note/Recertification Patient will continue to benefit from skilled PT services to modify and progress therapeutic interventions, address functional mobility deficits, address ROM deficits, address strength deficits, analyze and address soft tissue restrictions, analyze and modify body mechanics/ergonomics and assess and modify postural abnormalities to attain remaining goals. Progress toward goals / Updated goals:    New Goals to be achieved in __4__  weeks:  1.  Decrease max pain 50-75% to assist with walking/standing activities. slowly progressing 07/15 indicated pre vs post tx pain levels  2.  Patient will report >/= 10 minutes of standing/walking without increase in low back symptoms to indicate improved functional mobility tolerance. 3.  Will rate a +5 on Global Rating of Change and be prepared to DC to HEP.        PLAN  [x]  Upgrade activities as tolerated yes Continue plan of care   []  Discharge due to :    []  Other:      Therapist: Guillermo Landon    Date: 7/27/2021 Time: 3:14 PM     Future Appointments   Date Time Provider Hussein Saul   7/27/2021 12:00 PM Roxy Rey [facility]   7/29/2021 10:45 AM [facility] [id number]   8/2/2021 11:00 AM [facility]   8/4/2021 11:00 AM [facility]

## 2021-07-29 ENCOUNTER — APPOINTMENT (OUTPATIENT)
Dept: PHYSICAL THERAPY | Age: 71
End: 2021-07-29
Payer: COMMERCIAL

## 2021-08-02 ENCOUNTER — APPOINTMENT (OUTPATIENT)
Dept: PHYSICAL THERAPY | Age: 71
End: 2021-08-02

## 2021-08-04 ENCOUNTER — APPOINTMENT (OUTPATIENT)
Dept: PHYSICAL THERAPY | Age: 71
End: 2021-08-04